# Patient Record
Sex: FEMALE | Race: WHITE | NOT HISPANIC OR LATINO | Employment: OTHER | URBAN - NONMETROPOLITAN AREA
[De-identification: names, ages, dates, MRNs, and addresses within clinical notes are randomized per-mention and may not be internally consistent; named-entity substitution may affect disease eponyms.]

---

## 2024-06-14 RX ORDER — ASPIRIN 81 MG/1
81 TABLET ORAL DAILY
COMMUNITY

## 2024-06-14 RX ORDER — METHADONE HYDROCHLORIDE 10 MG/1
TABLET ORAL
COMMUNITY

## 2024-11-05 ENCOUNTER — TELEPHONE (OUTPATIENT)
Dept: OBSTETRICS AND GYNECOLOGY | Facility: CLINIC | Age: 29
End: 2024-11-05
Payer: COMMERCIAL

## 2024-11-05 NOTE — TELEPHONE ENCOUNTER
She can't abruptly stop methadone. Does she get this from a clinic? If so, she can ask them about weaning down or stopping.

## 2024-11-05 NOTE — TELEPHONE ENCOUNTER
Patient called stating she takes Methadone, but she fount out she was pregnant. She has a new ob appointment 11/22. Her PCP told her she is not able to just stop the medication but patient was very persistent on stopping the medication.

## 2024-11-22 ENCOUNTER — INITIAL PRENATAL (OUTPATIENT)
Dept: OBSTETRICS AND GYNECOLOGY | Facility: CLINIC | Age: 29
End: 2024-11-22
Payer: COMMERCIAL

## 2024-11-22 VITALS
BODY MASS INDEX: 50.02 KG/M2 | SYSTOLIC BLOOD PRESSURE: 140 MMHG | WEIGHT: 293 LBS | HEIGHT: 64 IN | DIASTOLIC BLOOD PRESSURE: 84 MMHG

## 2024-11-22 DIAGNOSIS — O99.321 SUBSTANCE ABUSE AFFECTING PREGNANCY IN FIRST TRIMESTER, ANTEPARTUM: Primary | ICD-10-CM

## 2024-11-22 DIAGNOSIS — F19.10 SUBSTANCE ABUSE AFFECTING PREGNANCY IN FIRST TRIMESTER, ANTEPARTUM: Primary | ICD-10-CM

## 2024-11-22 DIAGNOSIS — Z12.4 SCREENING FOR CERVICAL CANCER: ICD-10-CM

## 2024-11-22 PROBLEM — F11.20 METHADONE MAINTENANCE TREATMENT AFFECTING PREGNANCY: Status: ACTIVE | Noted: 2024-11-22

## 2024-11-22 PROBLEM — O99.320 METHADONE MAINTENANCE TREATMENT AFFECTING PREGNANCY: Status: ACTIVE | Noted: 2024-11-22

## 2024-11-22 RX ORDER — FLUCONAZOLE 150 MG/1
TABLET ORAL
COMMUNITY
Start: 2024-11-19

## 2024-11-22 RX ORDER — NYSTATIN 100000 [USP'U]/ML
SUSPENSION ORAL
COMMUNITY
Start: 2024-11-13

## 2024-11-22 RX ORDER — PROMETHAZINE HYDROCHLORIDE 25 MG/1
25 TABLET ORAL EVERY 6 HOURS PRN
Qty: 20 TABLET | Refills: 0 | Status: SHIPPED | OUTPATIENT
Start: 2024-11-22

## 2024-11-22 RX ORDER — ONDANSETRON 4 MG/1
TABLET, ORALLY DISINTEGRATING ORAL
COMMUNITY
Start: 2024-11-17

## 2024-11-22 NOTE — PROGRESS NOTES
"Subjective     Chief Complaint   Patient presents with    Initial Prenatal Visit     Last pap done in , C/O nausea, thrush on tongue       Valentina Campos is a 29 y.o. .  No LMP recorded. Patient is pregnant..  She presents to be seen to initiate prenatal care with our practice. She is currently being treated for thrush with swish and swallow and Diflucan. She hasn't been using Nystatin because it burns and she has sores in her mouth. She got treatment from urgent care. She has had 3 successful pregnancies with  at term. Her last pregnancy in  was complicated by elevated BP but doesn't remember whether she was on meds or not. Poor historian. She is currently on Methadone which she gets from [facility] clinic. She wants to cut down or quit this. She has been taking Zofran for nausea/vomiting but it isn't helping.    Past Medical History:   Diagnosis Date    Substance abuse      Social History     Socioeconomic History    Marital status:    Tobacco Use    Smoking status: Every Day     Current packs/day: 1.00     Average packs/day: 1 pack/day for 10.9 years (10.9 ttl pk-yrs)     Types: Cigarettes     Start date:     Smokeless tobacco: Never   Vaping Use    Vaping status: Never Used   Substance and Sexual Activity    Alcohol use: Not Currently    Drug use: Not Currently     Types: Methamphetamines, Methaqualone     Comment: clean 4 years    Sexual activity: Defer         The following portions of the patient's history were reviewed and updated as appropriate:vital signs, allergies, current medications, past family history, past medical history, past social history, past surgical history, and problem list.    Review of Systems -   /84   Ht 162.6 cm (64\")   Wt (!) 144 kg (316 lb 14.4 oz)   BMI 54.40 kg/m²   Gastrointestinal: Nausea and vomiting, denies constipation   Genitourinary: Frequency - denies urgency or burning with urination  All other systems reviewed and are negative    Objective "     Physical Exam  Constitutional   The patient is alert, well developed & well nourished.   Neck   The neck is supple and the trachea is midline. The thyroid is not enlarged and there are no palpable nodules.   Respiratory  The patient is relaxed and breathes without effort. Lungs CTAB  Cardiovascular  Regular rate and rhythm without murmur -  Negative LE pitting edema  Gastrointestinal   The abdomen is soft and non tender. No hepatosplenomegaly  Genitourinary   - External Genitalia without erythema, lesions, or masses  -Vagina - There is white vaginal discharge.   -Cervix without discharge  Negative cervical motion tenderness   Uterus - uterine body size is approximate to dates  Adnexa structures are without masses  Perineum is without inflammation or lesion  Skin  Normal color. No rashes or lesions  Extremities  Full ROM. No rashes or edema  Psychiatric  The patient is oriented to person, place, and time. Speech is fluent and words are clear    Imaging   Pelvic ultrasound report  10w, + FHT  US Ob Transvaginal (11/22/2024 10:00)       Assessment & Plan     ASSESSMENT  IUP at 10w0d   Hx of GHTN  First trimester discomforts of pregnancy.     PLAN  Tests ordered today:  Orders Placed This Encounter   Procedures    OB Panel With HIV and RPR     Order Specific Question:   Release to patient     Answer:   Routine Release [[id number]]    Urine Drug Screen - Urine, Clean Catch     Order Specific Question:   Release to patient     Answer:   Routine Release [[id number]]    [id number] PLUS Core+SCA - Blood,     Order Specific Question:   [facility] Date of last menstrual period or estimated date of delivery (corresponding to calculation method):     Answer:   6/20/2025     Order Specific Question:   [facility] Gestational age calculation method:     Answer:   TRISHA,EDC     Order Specific Question:   Release to patient     Answer:   Routine Release [[id number]]    Comprehensive Metabolic Panel     Order Specific Question:   Release  to patient     Answer:   Routine Release [[id number]]     Medications prescribed today:  New Medications Ordered This Visit   Medications    promethazine (PHENERGAN) 25 MG tablet     Sig: Take 1 tablet by mouth Every 6 (Six) Hours As Needed for Nausea or Vomiting.     Dispense:  20 tablet     Refill:  0     Information reviewed: smoking in pregnancy, exercise in pregnancy, nutrition in pregnancy, weight gain in pregnancy, work and travel restrictions during pregnancy, list of OTC medications acceptable in pregnancy, and call coverage groups  Genetic testing reviewed: Cystic Fibrosis Screen  Ginger products, Vit B6 supp, Unisom, or seabands PRN  Advised to F/U with PCP regarding thrush/oral health      Follow up: 4 week(s)         This note was electronically signed.    Jenna Tom CNM  11/22/2024

## 2024-11-24 LAB
ALBUMIN SERPL-MCNC: 4 G/DL (ref 3.9–4.9)
ALP SERPL-CCNC: 84 IU/L (ref 44–121)
ALT SERPL-CCNC: 218 IU/L (ref 0–32)
AST SERPL-CCNC: 239 IU/L (ref 0–40)
BILIRUB SERPL-MCNC: 0.5 MG/DL (ref 0–1.2)
BUN SERPL-MCNC: 17 MG/DL (ref 6–20)
BUN/CREAT SERPL: 18 (ref 9–23)
CALCIUM SERPL-MCNC: 9.4 MG/DL (ref 8.7–10.2)
CHLORIDE SERPL-SCNC: 101 MMOL/L (ref 96–106)
CO2 SERPL-SCNC: 17 MMOL/L (ref 20–29)
CREAT SERPL-MCNC: 0.95 MG/DL (ref 0.57–1)
EGFRCR SERPLBLD CKD-EPI 2021: 82 ML/MIN/1.73
GLOBULIN SER CALC-MCNC: 4.4 G/DL (ref 1.5–4.5)
GLUCOSE SERPL-MCNC: 112 MG/DL (ref 70–99)
Lab: NORMAL
POTASSIUM SERPL-SCNC: 4 MMOL/L (ref 3.5–5.2)
PROT SERPL-MCNC: 8.4 G/DL (ref 6–8.5)
SODIUM SERPL-SCNC: 137 MMOL/L (ref 134–144)
WRITTEN AUTHORIZATION: NORMAL

## 2024-11-26 LAB — REF LAB TEST METHOD: NORMAL

## 2024-11-29 LAB
ABO GROUP BLD: NORMAL
AMPHETAMINES UR QL SCN: NEGATIVE NG/ML
BARBITURATES UR QL SCN: NEGATIVE NG/ML
BASOPHILS # BLD AUTO: 0 X10E3/UL (ref 0–0.2)
BASOPHILS NFR BLD AUTO: 1 %
BENZODIAZ UR QL SCN: NEGATIVE NG/ML
BLD GP AB SCN SERPL QL: NEGATIVE
BZE UR QL SCN: NEGATIVE NG/ML
CANNABINOIDS UR QL SCN: POSITIVE NG/ML
CFDNA.FET/CFDNA.TOTAL SFR FETUS: ABNORMAL %
CITATION REF LAB TEST: ABNORMAL
CREAT UR-MCNC: 305.7 MG/DL (ref 20–300)
EOSINOPHIL # BLD AUTO: 0.1 X10E3/UL (ref 0–0.4)
EOSINOPHIL NFR BLD AUTO: 1 %
ERYTHROCYTE [DISTWIDTH] IN BLOOD BY AUTOMATED COUNT: 12.7 % (ref 11.7–15.4)
FET 13+18+21+X+Y ANEUP PLAS.CFDNA: ABNORMAL
GA EST FROM CONCEPTION DATE: ABNORMAL D
GESTATIONAL AGE > 9:: YES
HBV SURFACE AG SERPL QL IA: NEGATIVE
HCT VFR BLD AUTO: 46.4 % (ref 34–46.6)
HCV AB SERPL QL IA: NORMAL
HCV IGG SERPL QL IA: NON REACTIVE
HGB BLD-MCNC: 15.2 G/DL (ref 11.1–15.9)
HIV 1+2 AB+HIV1 P24 AG SERPL QL IA: NON REACTIVE
IMM GRANULOCYTES # BLD AUTO: 0 X10E3/UL (ref 0–0.1)
IMM GRANULOCYTES NFR BLD AUTO: 0 %
LAB DIRECTOR NAME PROVIDER: ABNORMAL
LAB DIRECTOR NAME PROVIDER: ABNORMAL
LABORATORY COMMENT REPORT: ABNORMAL
LABORATORY COMMENT REPORT: ABNORMAL
LIMITATIONS OF THE TEST: ABNORMAL
LYMPHOCYTES # BLD AUTO: 2.5 X10E3/UL (ref 0.7–3.1)
LYMPHOCYTES NFR BLD AUTO: 32 %
MCH RBC QN AUTO: 30.8 PG (ref 26.6–33)
MCHC RBC AUTO-ENTMCNC: 32.8 G/DL (ref 31.5–35.7)
MCV RBC AUTO: 94 FL (ref 79–97)
METHADONE UR QL SCN: POSITIVE NG/ML
MONOCYTES # BLD AUTO: 0.4 X10E3/UL (ref 0.1–0.9)
MONOCYTES NFR BLD AUTO: 5 %
MORPHOLOGY BLD-IMP: NORMAL
NEGATIVE PREDICTIVE VALUE: ABNORMAL
NEUTROPHILS # BLD AUTO: 4.7 X10E3/UL (ref 1.4–7)
NEUTROPHILS NFR BLD AUTO: 61 %
NOTE: ABNORMAL
OPIATES UR QL SCN: NEGATIVE NG/ML
OXYCODONE+OXYMORPHONE UR QL SCN: NEGATIVE NG/ML
PCP UR QL: NEGATIVE NG/ML
PERFORMANCE CHARACTERISTICS: ABNORMAL
PH UR: 6.3 [PH] (ref 4.5–8.9)
PLATELET # BLD AUTO: 172 X10E3/UL (ref 150–450)
PROPOXYPH UR QL SCN: NEGATIVE NG/ML
RBC # BLD AUTO: 4.94 X10E6/UL (ref 3.77–5.28)
REF LAB TEST METHOD: ABNORMAL
RH BLD: POSITIVE
RPR SER QL: NON REACTIVE
RUBV IGG SERPL IA-ACNC: 2.3 INDEX
TEST PERFORMANCE INFO SPEC: ABNORMAL
WBC # BLD AUTO: 7.7 X10E3/UL (ref 3.4–10.8)

## 2024-12-03 DIAGNOSIS — O99.321 SUBSTANCE ABUSE AFFECTING PREGNANCY IN FIRST TRIMESTER, ANTEPARTUM: Primary | ICD-10-CM

## 2024-12-03 DIAGNOSIS — F19.10 SUBSTANCE ABUSE AFFECTING PREGNANCY IN FIRST TRIMESTER, ANTEPARTUM: Primary | ICD-10-CM

## 2024-12-07 LAB
CFDNA.FET/CFDNA.TOTAL SFR FETUS: NORMAL %
CITATION REF LAB TEST: NORMAL
FET 13+18+21+X+Y ANEUP PLAS.CFDNA: NEGATIVE
FET CHR 21 TS PLAS.CFDNA QL: NEGATIVE
FET MS X RISK WBC.DNA+CFDNA QL: NOT DETECTED
FET SEX PLAS.CFDNA DOSAGE CFDNA: NORMAL
FET TS 13 RISK PLAS.CFDNA QL: NEGATIVE
FET TS 18 RISK WBC.DNA+CFDNA QL: NEGATIVE
FET X + Y ANEUP RISK PLAS.CFDNA SEQ-IMP: NOT DETECTED
GA EST FROM CONCEPTION DATE: NORMAL D
GESTATIONAL AGE > 9:: YES
LAB DIRECTOR NAME PROVIDER: NORMAL
LAB DIRECTOR NAME PROVIDER: NORMAL
LABORATORY COMMENT REPORT: NORMAL
LIMITATIONS OF THE TEST: NORMAL
NEGATIVE PREDICTIVE VALUE: NORMAL
NOTE: NORMAL
PERFORMANCE CHARACTERISTICS: NORMAL
POSITIVE PREDICTIVE VALUE: NORMAL
REF LAB TEST METHOD: NORMAL
TEST PERFORMANCE INFO SPEC: NORMAL

## 2024-12-12 ENCOUNTER — APPOINTMENT (OUTPATIENT)
Dept: ULTRASOUND IMAGING | Facility: HOSPITAL | Age: 29
End: 2024-12-12
Payer: COMMERCIAL

## 2024-12-12 ENCOUNTER — HOSPITAL ENCOUNTER (EMERGENCY)
Facility: HOSPITAL | Age: 29
Discharge: HOME | End: 2024-12-12
Attending: EMERGENCY MEDICINE
Payer: COMMERCIAL

## 2024-12-12 VITALS
RESPIRATION RATE: 16 BRPM | HEIGHT: 64 IN | SYSTOLIC BLOOD PRESSURE: 133 MMHG | HEART RATE: 82 BPM | WEIGHT: 293 LBS | DIASTOLIC BLOOD PRESSURE: 87 MMHG | TEMPERATURE: 97.7 F | OXYGEN SATURATION: 96 % | BODY MASS INDEX: 50.02 KG/M2

## 2024-12-12 DIAGNOSIS — O46.90 VAGINAL BLEEDING IN PREGNANCY: Primary | ICD-10-CM

## 2024-12-12 LAB
ABO GROUP BLD: NORMAL
ALBUMIN SERPL-MCNC: 3.5 G/DL (ref 3.5–5.2)
ALBUMIN/GLOB SERPL: 1 G/DL
ALP SERPL-CCNC: 57 U/L (ref 39–117)
ALT SERPL W P-5'-P-CCNC: 10 U/L (ref 1–33)
ANION GAP SERPL CALCULATED.3IONS-SCNC: 11.7 MMOL/L (ref 5–15)
AST SERPL-CCNC: 14 U/L (ref 1–32)
BASOPHILS # BLD AUTO: 0.02 10*3/MM3 (ref 0–0.2)
BASOPHILS NFR BLD AUTO: 0.2 % (ref 0–1.5)
BILIRUB SERPL-MCNC: 0.3 MG/DL (ref 0–1.2)
BUN SERPL-MCNC: 11 MG/DL (ref 6–20)
BUN/CREAT SERPL: 16.7 (ref 7–25)
CALCIUM SPEC-SCNC: 9 MG/DL (ref 8.6–10.5)
CHLORIDE SERPL-SCNC: 104 MMOL/L (ref 98–107)
CO2 SERPL-SCNC: 21.3 MMOL/L (ref 22–29)
CREAT SERPL-MCNC: 0.66 MG/DL (ref 0.57–1)
DEPRECATED RDW RBC AUTO: 44.8 FL (ref 37–54)
EGFRCR SERPLBLD CKD-EPI 2021: 120.4 ML/MIN/1.73
EOSINOPHIL # BLD AUTO: 0.18 10*3/MM3 (ref 0–0.4)
EOSINOPHIL NFR BLD AUTO: 2.1 % (ref 0.3–6.2)
ERYTHROCYTE [DISTWIDTH] IN BLOOD BY AUTOMATED COUNT: 13.2 % (ref 12.3–15.4)
GLOBULIN UR ELPH-MCNC: 3.6 GM/DL
GLUCOSE SERPL-MCNC: 107 MG/DL (ref 65–99)
HCG INTACT+B SERPL-ACNC: NORMAL MIU/ML
HCT VFR BLD AUTO: 36.3 % (ref 34–46.6)
HGB BLD-MCNC: 12.4 G/DL (ref 12–15.9)
IMM GRANULOCYTES # BLD AUTO: 0.04 10*3/MM3 (ref 0–0.05)
IMM GRANULOCYTES NFR BLD AUTO: 0.5 % (ref 0–0.5)
LYMPHOCYTES # BLD AUTO: 1.79 10*3/MM3 (ref 0.7–3.1)
LYMPHOCYTES NFR BLD AUTO: 20.9 % (ref 19.6–45.3)
MCH RBC QN AUTO: 31.6 PG (ref 26.6–33)
MCHC RBC AUTO-ENTMCNC: 34.2 G/DL (ref 31.5–35.7)
MCV RBC AUTO: 92.6 FL (ref 79–97)
MONOCYTES # BLD AUTO: 0.41 10*3/MM3 (ref 0.1–0.9)
MONOCYTES NFR BLD AUTO: 4.8 % (ref 5–12)
NEUTROPHILS NFR BLD AUTO: 6.14 10*3/MM3 (ref 1.7–7)
NEUTROPHILS NFR BLD AUTO: 71.5 % (ref 42.7–76)
NRBC BLD AUTO-RTO: 0 /100 WBC (ref 0–0.2)
PLATELET # BLD AUTO: 188 10*3/MM3 (ref 140–450)
PMV BLD AUTO: 11.1 FL (ref 6–12)
POTASSIUM SERPL-SCNC: 4 MMOL/L (ref 3.5–5.2)
PROT SERPL-MCNC: 7.1 G/DL (ref 6–8.5)
RBC # BLD AUTO: 3.92 10*6/MM3 (ref 3.77–5.28)
RH BLD: POSITIVE
SODIUM SERPL-SCNC: 137 MMOL/L (ref 136–145)
WBC NRBC COR # BLD AUTO: 8.58 10*3/MM3 (ref 3.4–10.8)

## 2024-12-12 PROCEDURE — 76801 OB US < 14 WKS SINGLE FETUS: CPT

## 2024-12-12 PROCEDURE — 85025 COMPLETE CBC W/AUTO DIFF WBC: CPT | Performed by: EMERGENCY MEDICINE

## 2024-12-12 PROCEDURE — 99284 EMERGENCY DEPT VISIT MOD MDM: CPT | Performed by: EMERGENCY MEDICINE

## 2024-12-12 PROCEDURE — 84702 CHORIONIC GONADOTROPIN TEST: CPT | Performed by: EMERGENCY MEDICINE

## 2024-12-12 PROCEDURE — 80053 COMPREHEN METABOLIC PANEL: CPT | Performed by: EMERGENCY MEDICINE

## 2024-12-12 PROCEDURE — 86901 BLOOD TYPING SEROLOGIC RH(D): CPT | Performed by: EMERGENCY MEDICINE

## 2024-12-12 PROCEDURE — 86900 BLOOD TYPING SEROLOGIC ABO: CPT | Performed by: EMERGENCY MEDICINE

## 2024-12-12 PROCEDURE — 36415 COLL VENOUS BLD VENIPUNCTURE: CPT

## 2024-12-12 NOTE — ED PROVIDER NOTES
[facility] EMERGENCY DEPARTMENT  Emergency Department Encounter  Emergency Medicine Physician Note     Pt Name:Valentina Campos  MRN: [id number]  Birthdate 1995  Date of evaluation: 2024  PCP:  Eric Elliott MD  Note Started: 12:39 PM EST      CHIEF COMPLAINT       Chief Complaint   Patient presents with    Vaginal Bleeding - Pregnant       HISTORY OF PRESENT ILLNESS  (Location/Symptom, Timing/Onset, Context/Setting, Quality, Duration, Modifying Factors, Severity.)      Valentina Campos is a 29 y.o. female who presents with vaginal bleeding, patient describes more vaginal spotting at this time.  Describes earlier this morning that she had blood running down her leg into a small little pool that she demonstrates is a couple inch diameter.  Patient is noted to be , with 1 child fatality and 2 miscarriages.  Patient denies any abdominal pain, does describe back pain.  Patient describes onset of symptoms around 5 AM this morning.  Patient was seen at outside ER earlier today and evaluated.  Patient was instructed to be evaluated in our emergency department by OB/GYN which she called nursing line.      PAST MEDICAL / SURGICAL / SOCIAL / FAMILY HISTORY     Past Medical History:   Diagnosis Date    Substance abuse      No additional pertinent       Past Surgical History:   Procedure Laterality Date    CHOLECYSTECTOMY      MOUTH SURGERY       No additional pertinent       Social History     Socioeconomic History    Marital status:    Tobacco Use    Smoking status: Every Day     Current packs/day: 1.00     Average packs/day: 1 pack/day for 10.9 years (10.9 ttl pk-yrs)     Types: Cigarettes     Start date:     Smokeless tobacco: Never   Vaping Use    Vaping status: Never Used   Substance and Sexual Activity    Alcohol use: Not Currently    Drug use: Not Currently     Types: Methamphetamines, Methaqualone     Comment: clean 4 years    Sexual activity: Defer       Family  "History   Problem Relation Age of Onset    Obesity Mother     Cancer Mother     Hypertension Father     Obesity Maternal Grandmother        Allergies:  Patient has no known allergies.    Home Medications:  Prior to Admission medications    Medication Sig Start Date End Date Taking? Authorizing Provider   fluconazole (DIFLUCAN) 150 MG tablet TAKE 1 TABLET BY MOUTH EVERY 72 HOURS FOR 4 DAYS 11/19/24   Jesus Clark MD   methadone (DOLOPHINE) 10 MG tablet Take by mouth,    Jesus Clark MD   nystatin (MYCOSTATIN) 100,000 unit/mL suspension SHAKE LIQUID AND SWISH AND SWALLOW 4 ML BY MOUTH FOUR TIMES DAILY FOR 14 DAYS 11/13/24   Jesus Clark MD   ondansetron ODT (ZOFRAN-ODT) 4 MG disintegrating tablet DISSOLVE 1 TO 2 TABLETS ON THE TONGUE EVERY 8 HOURS AS NEEDED 11/17/24   Jesus Clark MD   promethazine (PHENERGAN) 25 MG tablet Take 1 tablet by mouth Every 6 (Six) Hours As Needed for Nausea or Vomiting. 11/22/24   Jenna Tom CNM         REVIEW OF SYSTEMS       Review of Systems   Respiratory:  Negative for chest tightness and shortness of breath.    Cardiovascular:  Negative for chest pain.   Gastrointestinal:  Negative for abdominal pain, nausea and vomiting.   Endocrine: Negative for polyuria.   Genitourinary:  Positive for vaginal bleeding. Negative for difficulty urinating and frequency.   Musculoskeletal:  Positive for back pain.       PHYSICAL EXAM      INITIAL VITALS:   /87   Pulse 82   Temp 97.7 °F (36.5 °C) (Oral)   Resp 16   Ht 162.6 cm (64\")   Wt (!) 149 kg (328 lb 7.8 oz)   SpO2 96%   BMI 56.38 kg/m²     Physical Exam  Constitutional:       Appearance: Normal appearance. She is obese.   HENT:      Head: Normocephalic and atraumatic.      Mouth/Throat:      Mouth: Mucous membranes are moist.      Pharynx: Oropharynx is clear.   Cardiovascular:      Rate and Rhythm: Normal rate and regular rhythm.      Pulses: Normal pulses.   Pulmonary:      Effort: " Pulmonary effort is normal.   Abdominal:      General: Abdomen is flat. There is no distension.      Palpations: Abdomen is soft.      Tenderness: There is no abdominal tenderness. There is right CVA tenderness. There is no guarding.   Musculoskeletal:         General: Normal range of motion.   Skin:     General: Skin is warm and dry.   Neurological:      General: No focal deficit present.      Mental Status: She is alert and oriented to person, place, and time.   Psychiatric:         Mood and Affect: Mood normal.         Behavior: Behavior normal.           DDX/DIAGNOSTIC RESULTS / EMERGENCY DEPARTMENT COURSE / MDM     Differential Diagnosis included but not limited: Abnormal bleeding in pregnancy.  Subchorionic hemorrhage, placenta previa, miscarriage    Diagnoses Considered but Do Not Suspect: Ectopic pregnancy, active miscarriage    Decision Rules/Scores utilized: N/A     Tests considered but not ordered and why:  N/A     MIPS: N/A     Code Status Discussion:  Not Discussed    Additional Patient Education Provided: None     Medical Decision Making    Medical Decision Making  Patient presenting of vaginal bleeding and prep full week pregnant.  Patient is a high risk pregnancy due to drug use and smoking.  Patient was evaluated in outside emergency department earlier today for vaginal bleeding.  Patient was stable vitals, nontachycardic nonhypotensive.  Patient with back pain.  Ultrasound demonstrated fetal movement normal on bedside, unable to assess the heart rate on bedside ultrasound.  Formal ultrasound demonstrated heart rate noted to be normal.  Patient with hCGs its appropriate, normal hemoglobin, low concerns for dysuria or vaginal bleed at this time.  Patient instructed to follow-up with her OB/GYN, no interventions at this time as patient is under 20 weeks.  Patient instructed to continue her prenatal vitamins.  Patient instructed to quit smoking.  Patient was agreeable with this, multiple minutes spent  on smoking sensation discussion with her.  Patient states that she will follow-up with her OB/GYN next week, does have an appointment 12/23 and do feel this is appropriate if they are unable to get her in sooner.  Patient declined vaginal exam, wanted to be discharged.  Patient instructed return for any worsening abdominal pain, vaginal bleeding, or any other concerns.  Patient did inform that she could be having a miscarriage if things continue to worsen.    Problems Addressed:  Vaginal bleeding in pregnancy: complicated acute illness or injury    Amount and/or Complexity of Data Reviewed  Labs: ordered.  Radiology: ordered.        See ED COURSE for additional MDM statements    EKG  None Performed     All EKG's are interpreted by the Emergency Department Physician who either signs or Co-signs this chart in the absence of a cardiologist.    Additional Scores                   EMERGENCY DEPARTMENT COURSE:    ED Course as of 12/12/24 1519   Thu Dec 12, 2024   1138 Patient with previous Rh+ no indication for RhoGAM. [CR]   1337 Patient with appropriate quant. [CR]   1352 Patient educated on the importance to quit smoking with being pregnant. [CR]   1356 Patient requesting discharge, requesting IV out.  Patient was offered vaginal exam, patient declining vaginal exam.  Patient wishes to get in with her OB/GYN, she has an appointment on 23 December.  Did instruct her to try to see if she can see them earlier next week if possible.  Patient is agreeable with this. [CR]      ED Course User Index  [CR] Wang Cifuentes,        PROCEDURES:  None Performed   Procedures    DATA FOR LAB AND RADIOLOGY TESTS ORDERED BELOW ARE REVIEWED BY THE ED CLINICIAN:    RADIOLOGY: All x-rays, CT, MRI, and formal ultrasound images (except ED bedside ultrasound) are read by the radiologist, see reports below, unless otherwise noted in MDM or here.  Reports below are reviewed by myself.  US Ob < 14 Weeks Single or First Gestation    Final Result   Single intrauterine pregnancy 13 weeks 3 days gestational   age with documented fetal heart motion.               This report was signed and finalized on 12/12/2024 1:39 PM by Erika Henriquez MD.              LABS: Lab orders shown below, the results are reviewed by myself, and all abnormals are listed below.  Labs Reviewed   COMPREHENSIVE METABOLIC PANEL - Abnormal; Notable for the following components:       Result Value    Glucose 107 (*)     CO2 21.3 (*)     All other components within normal limits    Narrative:     GFR Categories in Chronic Kidney Disease (CKD)      GFR Category          GFR (mL/min/1.73)    Interpretation  G1                     90 or greater         Normal or high (1)  G2                      60-89                Mild decrease (1)  G3a                   45-59                Mild to moderate decrease  G3b                   30-44                Moderate to severe decrease  G4                    15-29                Severe decrease  G5                    14 or less           Kidney failure          (1)In the absence of evidence of kidney disease, neither GFR category G1 or G2 fulfill the criteria for CKD.    eGFR calculation 2021 CKD-EPI creatinine equation, which does not include race as a factor   CBC WITH AUTO DIFFERENTIAL - Abnormal; Notable for the following components:    Monocyte % 4.8 (*)     All other components within normal limits   HCG, QUANTITATIVE, PREGNANCY    Narrative:     HCG Ranges by Gestational Age    Females - non-pregnant premenopausal   </= 1mIU/mL HCG  Females - postmenopausal               </= 7mIU/mL HCG    3 Weeks         5.8 -    71.2 mIU/mL  4 Weeks         9.5 -     750 mIU/mL  5 Weeks         217 -   7,138 mIU/mL  6 Weeks         158 -  31,795 mIU/mL  7 Weeks       3,697 - 163,563 mIU/mL  8 Weeks      32,065 - 149,571 mIU/mL  9 Weeks      63,803 - 151,410 mIU/mL  10 Weeks     46,509 - 186,977 mIU/mL  12 Weeks     27,832 - 210,612 mIU/mL  14 Weeks   "   13,950 -  62,530 mIU/mL  15 Weeks     12,039 -  70,971 mIU/mL  16 Weeks      9,040 -  56,451 mIU/mL  17 Weeks      8,175 -  55,868 mIU/mL  18 Weeks      8,099 -  58,176 mIU/mL   ABO/RH   CBC AND DIFFERENTIAL    Narrative:     The following orders were created for panel order CBC & Differential.  Procedure                               Abnormality         Status                     ---------                               -----------         ------                     CBC Auto Differential[[id number]]        Abnormal            Final result                 Please view results for these tests on the individual orders.       Vitals Reviewed:    Vitals:    12/12/24 1129 12/12/24 1143 12/12/24 1420   BP: 141/69  133/87   Pulse: 87  82   Resp: 18  16   Temp: 97.7 °F (36.5 °C)     TempSrc: Oral     SpO2: 99%  96%   Weight:  (!) 149 kg (328 lb 7.8 oz)    Height: 162.6 cm (64\")         MEDICATIONS GIVEN TO PATIENT THIS ENCOUNTER:  Medications - No data to display    CONSULTS:  None    CRITICAL CARE:  There was significant risk of life threatening deterioration of patient's condition requiring my direct management. Critical care time 0 minutes, excluding any documented procedures.    FINAL IMPRESSION      1. Vaginal bleeding in pregnancy          DISPOSITION / PLAN     ED Disposition       ED Disposition   Discharge    Condition   Stable    Comment   --               PATIENT REFERRED TO:  [facility]  [address], [address]  [place] [address] [phone]  Schedule an appointment as soon as possible for a visit in 2 days        DISCHARGE MEDICATIONS:     Medication List        CONTINUE taking these medications      fluconazole 150 MG tablet  Commonly known as: DIFLUCAN     methadone 10 MG tablet  Commonly known as: DOLOPHINE     nystatin 100,000 unit/mL suspension  Commonly known as: MYCOSTATIN     ondansetron ODT 4 MG disintegrating tablet  Commonly known as: " ZOFRAN-ODT     promethazine 25 MG tablet  Commonly known as: PHENERGAN  Take 1 tablet by mouth Every 6 (Six) Hours As Needed for Nausea or Vomiting.              Electronically signed by Wang Cifuentes DO, 12/12/24, 12:39 PM EST.    Emergency Medicine Physician  [facility]  (Please note that portions of thisnote were completed with a voice recognition program.  Efforts were made to edit the dictations but occasionally words are mis-transcribed.)         Wang Cifuentes,   12/12/24 1529

## 2024-12-30 ENCOUNTER — PATIENT OUTREACH (OUTPATIENT)
Dept: LABOR AND DELIVERY | Facility: HOSPITAL | Age: 29
End: 2024-12-30
Payer: COMMERCIAL

## 2024-12-30 ENCOUNTER — ROUTINE PRENATAL (OUTPATIENT)
Dept: OBSTETRICS AND GYNECOLOGY | Facility: CLINIC | Age: 29
End: 2024-12-30
Payer: COMMERCIAL

## 2024-12-30 ENCOUNTER — REFERRAL TRIAGE (OUTPATIENT)
Dept: LABOR AND DELIVERY | Facility: HOSPITAL | Age: 29
End: 2024-12-30
Payer: COMMERCIAL

## 2024-12-30 VITALS — BODY MASS INDEX: 54.58 KG/M2 | SYSTOLIC BLOOD PRESSURE: 130 MMHG | DIASTOLIC BLOOD PRESSURE: 80 MMHG | WEIGHT: 293 LBS

## 2024-12-30 DIAGNOSIS — F11.20 METHADONE MAINTENANCE TREATMENT AFFECTING PREGNANCY IN SECOND TRIMESTER: Primary | ICD-10-CM

## 2024-12-30 DIAGNOSIS — O99.322 METHADONE MAINTENANCE TREATMENT AFFECTING PREGNANCY IN SECOND TRIMESTER: Primary | ICD-10-CM

## 2024-12-30 RX ORDER — FAMOTIDINE 20 MG/1
20 TABLET, FILM COATED ORAL 2 TIMES DAILY PRN
Qty: 60 TABLET | Refills: 3 | Status: SHIPPED | OUTPATIENT
Start: 2024-12-30 | End: 2025-12-30

## 2024-12-30 NOTE — PROGRESS NOTES
Chief Complaint   Patient presents with    Routine Prenatal Visit     No complaints/concerns        HPI: Valentina is a  currently at 15w3d here for prenatal visit who reports the following:  She denies any problems. Her methadone was decreased to 72 mg. Zofran didn't help with nausea and Phenergan seemed to worsen it.                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  GABI Bagley  2024

## 2024-12-31 NOTE — OUTREACH NOTE
[facility]  Intake    Current Estimated Gestational Age: 15w3d    Intake Assessment      Flowsheet Row Responses   Best Method for Contacting Cell   Currently Employed No   Able to keep appointments as scheduled Yes   Gender(s) and Name(s) boy-Shashi   Do you have a dentist? No   Resources Presently Utilizing: WIC (Women, Infant, Children), Other   Other Resources Utilizing: SNAP   Maternal Warning Signs Provided   Other Education HANDS, Housing Assistance, How to find a dentist, How to find a pediatrician, How to find a primary care provider, Insurance benefits/Incentives, SNAP Benefits, WIC Benefits            Learning Assessment      Flowsheet Row Responses   Relationship Patient   Learner Name Valentina Campos   Does the learner have any barriers to learning? No Barriers   What is the preferred language of the learner for medical teaching? English   Is an  required? No   How does the learner prefer to learn new concepts? Listening, Reading, Demonstration, Pictures/Video            Met with pt in office. Valentina is seeing GABI Swann for her prenatal care today.  Her history is significant for hx substance use clean x4 years, current methadone use.    She has no SI/HI and currently feels safe. Encouraged to consider seeing a mental health provider. Discussed that we will be screening periodically for  and postpartum changes with mood looking for trends which may need to be addressed. VU.    She is feeling well and has begun gathering items she will need for baby care. She is not interested in childbirth classes. Prenatal and breastfeeding education discussed. Discussed bonus benefits of pregnancy from insurance for potential assistance with some infant care items. She plans to use [facility] Kuhn for pediatrician in [facility]. She has a 3 & 9 y/o girls and is currently pregnant with a baby boy she plans to name Shashi.      RN gave pt food bags with resources to get her by until  her food stamps renew on 12th. Multiple resources provided via [employer] message. Encouraged to look at both the [employer] message and in the Visits tab for educational items pertaining to her pregnancy in the AVS. Contact information provided. Encouraged to call with questions, concerns, or for support. Will plan f/u around 28 weeks for wellness and resource needs check in.    Tobacco, Alcohol, and Drug History     reports that she has been smoking cigarettes. She started smoking about 11 years ago. She has a 11 pack-year smoking history. She has never used smokeless tobacco.   reports that she does not currently use alcohol.   reports that she does not currently use drugs after having used the following drugs: Methamphetamines and Methaqualone.    Alicia GUERRIER  Maternity Nurse Navigator    12/30/2024, 1302EST            [facility]  Enrollment    Current Estimated Gestational Age: 15w3d    Questions/Answers      Flowsheet Row Responses   Would like to participate? Yes   Date of Intake Visit 12/30/24                Alicia GUERRIER  Maternity Nurse Navigator    12/30/2024, 1302EST

## 2025-03-24 ENCOUNTER — PATIENT OUTREACH (OUTPATIENT)
Dept: LABOR AND DELIVERY | Facility: HOSPITAL | Age: 30
End: 2025-03-24
Payer: COMMERCIAL

## 2025-03-24 ENCOUNTER — ROUTINE PRENATAL (OUTPATIENT)
Dept: OBSTETRICS AND GYNECOLOGY | Facility: CLINIC | Age: 30
End: 2025-03-24
Payer: COMMERCIAL

## 2025-03-24 VITALS — DIASTOLIC BLOOD PRESSURE: 80 MMHG | WEIGHT: 293 LBS | BODY MASS INDEX: 52.35 KG/M2 | SYSTOLIC BLOOD PRESSURE: 126 MMHG

## 2025-03-24 DIAGNOSIS — F11.20 METHADONE MAINTENANCE TREATMENT AFFECTING PREGNANCY IN SECOND TRIMESTER: ICD-10-CM

## 2025-03-24 DIAGNOSIS — Z36.89 ENCOUNTER FOR FETAL ANATOMIC SURVEY: Primary | ICD-10-CM

## 2025-03-24 DIAGNOSIS — O99.322 METHADONE MAINTENANCE TREATMENT AFFECTING PREGNANCY IN SECOND TRIMESTER: ICD-10-CM

## 2025-03-24 PROCEDURE — 99213 OFFICE O/P EST LOW 20 MIN: CPT | Performed by: MIDWIFE

## 2025-03-24 NOTE — PROGRESS NOTES
Chief Complaint   Patient presents with    Routine Prenatal Visit     Has not been seen since 15 weeks        HPI: Valentina is a  currently at 27w3d here for prenatal visit who reports the following:  Baby is active. She has had transportation issues. She hasn't done anatomy scan or glucola. Her methadone dose is down to 65 mg daily.                EXAM:     Vitals:    25 1351   BP: 126/80      Abdomen:   See prenatal flowsheet as noted and reviewed, soft, nontender   Pelvic:  See prenatal flowsheet as noted and reviewed   Urine:  See prenatal flowsheet as noted and reviewed    Lab Results   Component Value Date    ABO A 2024    RH Positive 2024    ABSCRN Negative 2024       MDM:  Impression: H/O prior narcotics abuse - in remission on maintenance  Obesity in pregnancy   Tests done today: Anatomy scan-anatomy not completely seen   Topics discussed: kick counts and fetal movement  Referral to [facility] for anatomy scan  Reviewed OB labs   Tests next visit: GCT  HgB                RTO:                        2 weeks    This note was electronically signed.  Jenna Tom, GABI  3/24/2025   Chief complaint:   Chief Complaint   Patient presents with    Back Pain     11/22/23 Right L3-L4 transforaminal epidural steroid injection       Vitals:  Visit Vitals  /80   Pulse 76   Resp 14   Ht 5' 11\" (1.803 m)   Wt 84.4 kg (186 lb)   BMI 25.94 kg/m²       HISTORY OF PRESENT ILLNESS     HPI  Mr. Montgomery is a 65 year old male  with a history of  low back pain who presents today for a follow-up visit after right L3-4 transforaminal epidural steroid injection done 11/22/2023.  He states that he is currently pain-free.  He is occasional twinges of pain.  However he is able to carry out most of his activities without significant pain.  Also has left shoulder and left knee pain for which she is had steroid injections by another provider and he is have helped.  Other significant problems:  Patient Active Problem List    Diagnosis Date Noted    History of alcohol dependence (CMD) 10/27/2023     Priority: Low    Hammer toe of left foot 10/27/2023     Priority: Low    THIERNO (generalized anxiety disorder) 06/16/2023     Priority: Low    Hypovitaminosis D 03/31/2022     Priority: Low    Dyslipidemia 10/01/2021     Priority: Low    Lichen simplex of male genitalia 10/01/2021     Priority: Low    Nocturnal leg cramps 02/13/2020     Priority: Low    Agatston coronary artery calcium score greater than 400      Priority: Low    Male hypogonadism 02/27/2015     Priority: Low    Seasonal allergies 11/29/2012     Priority: Low    Essential hypertension, benign 11/29/2012     Priority: Low    Chronic insomnia 11/29/2012     Priority: Low    Migraine 11/29/2012     Priority: Low       PAST MEDICAL, FAMILY AND SOCIAL HISTORY     Medications:  Current Outpatient Medications   Medication Sig Dispense Refill    diazePAM (VALIUM) 5 MG tablet Take 1 tablet by mouth daily as needed for Anxiety. Month supply. 20 tablet 1    zoster vaccine recomb adjuvanted (SHINGRIX) 50 MCG/0.5ML injection Inject 0.5 mLs into the muscle 1 time. 1 each 0     fluconazole (DIFLUCAN) 150 MG tablet Take 2 tablets by mouth 1 day a week. 8 tablet 0    clobetasol (TEMOVATE) 0.05 % cream Apply topically 2 times daily. 30 g 5    escitalopram (LEXAPRO) 10 MG tablet Take 1 tablet by mouth daily. 90 tablet 1    celecoxib (CeleBREX) 200 MG capsule Take 1 capsule by mouth daily. 90 capsule 3    nalTREXone (REVIA) 50 MG tablet Take 1 tablet by mouth daily. 30 tablet 5    zolpidem (AMBIEN CR) 12.5 MG CR tablet Take 1 tablet by mouth nightly as needed for Sleep. 30 tablet 5    sildenafil (Viagra) 100 MG tablet Take 1 tablet by mouth daily as needed for Erectile Dysfunction. 60 tablet 11    loratadine (CLARITIN) 10 MG tablet Take 1 tablet by mouth daily. 90 tablet 3    atenolol (TENORMIN) 25 MG tablet Take 1 tablet by mouth daily. 90 tablet 4    simvastatin (ZOCOR) 20 MG tablet Take 1 tablet by mouth daily. 90 tablet 4    ketoconazole (Nizoral) 2 % shampoo 1 topical application, twice weekly to affected area(s) 120 mL 11    thiamine (VITAMIN B1) 100 MG tablet Take 1 tablet by mouth daily. 30 tablet 11    aspirin 81 MG tablet Take 1 tablet by mouth daily.       No current facility-administered medications for this visit.       Allergies:  ALLERGIES:   Allergen Reactions    Aspirin RASH    Penicillins RASH    Azithromycin PRURITUS    Doxepin INSOMNIA    Lisinopril Other (See Comments)     photosensitivity    Quetiapine Other (See Comments)     confusion    Temazepam Other (See Comments)     Nightmares, sleep walking    Magnesium Citrate RASH       Past Medical  History/Surgeries:  Past Medical History:   Diagnosis Date    Acute sinusitis 10/3/2016    Agatston coronary artery calcium score greater than 400     867     Chronic back pain     Chronic daily headache     HTN (hypertension)     Inguinal hernia     left    Insomnia     Male hypogonadism 2/27/2015    Pharyngitis, acute 10/3/2016       Past Surgical History:   Procedure Laterality Date    Cologuard      refused 2020    Colonoscopy  diagnostic  04/29/2008    refused 2020, Bellin/normal/needs follow up in 10 years per Dr. Helm    Esophagogastroduodenoscopy transoral flex diag  04/29/2008    Bellin/normal per report     Inguinal hernia repair  01/29/2010       Family History:  Family History   Problem Relation Age of Onset    Cancer, Prostate Father 80       Social History:  Social History     Tobacco Use    Smoking status: Never    Smokeless tobacco: Never   Substance Use Topics    Alcohol use: Yes     Alcohol/week: 1.0 standard drink of alcohol     Types: 1 Standard drinks or equivalent per week     Comment: 1 per week       REVIEW OF SYSTEMS     Review of Systems   Constitutional: Negative.  Negative for activity change and appetite change.   Respiratory: Negative.  Negative for cough and shortness of breath.    Cardiovascular: Negative.  Negative for leg swelling.   Gastrointestinal: Negative.  Negative for nausea and vomiting.   Genitourinary: Negative.  Negative for difficulty urinating.   Psychiatric/Behavioral: Negative.  Negative for sleep disturbance.        PHYSICAL EXAM     Physical Exam    ASSESSMENT/PLAN     ASSESSMENT:   1. Intractable back pain    2. Lumbar disc herniation      PLAN:  1.  The pathophysiology of the patient's pain symptoms were discussed in detail. Different treatment options were discussed.   2. Counseled on non medical management of pain including heat, ice, stretching, TENS, exercises,  and non opiate medications.  3. Goals: Improved functionality  4.  Follow up prn  Orders Placed This Encounter    SERVICE TO PHYSICAL THERAPY     ASA: 2    Risks, benefits and alternative options discussed with the patient. All questions were answered to his complete satisfaction. Patient demonstrated complete understanding and agreed to proceed with the treatment plan.  Portions of this note may have been dictated using a voice-activated software. Appropriate corrections to the document have been made; however, mistakes in  wording may still exist.

## 2025-03-28 NOTE — OUTREACH NOTE
[facility]  Check-In    Current Estimated Gestational Age: 27w3d      Questions/Answers      Flowsheet Row Responses   Best Method for Contacting Cell   Demographics Reviewed Yes   Currently Employed No   Able to keep appointments as scheduled Yes   Gender(s) and Name(s) Boy-Shashi   Baby Active/Feeling Fetal Movemen Yes   How are you presently feeling? good   Questions regarding prenatal visits or tests to be ordered? No   Education related to new diagnoses/home equipment No                Alicia GUERRIER  Maternity Nurse Navigator    3/28/2025, 17:06 EDT        SDOH updated and reviewed with the patient during this program:  --     Alcohol Use: Not At Risk (3/24/2025)    AUDIT-C     Frequency of Alcohol Consumption: Never     Average Number of Drinks: Patient does not drink     Frequency of Binge Drinking: Never      --     Depression: Not at risk (3/24/2025)    PHQ-2     PHQ-2 Score: 0      --     Disabilities: At Risk (3/24/2025)    Disabilities     Concentrating, Remembering, or Making Decisions Difficulty: yes     Doing Errands Independently Difficulty: no      --     Employment: Not At Risk (3/24/2025)    Employment     Do you want help finding or keeping work or a job?: I do not need or want help      Financial Resource Strain: Medium Risk (3/24/2025)    Overall Financial Resource Strain (CARDIA)     Difficulty of Paying Living Expenses: Somewhat hard      --     Food Insecurity: Food Insecurity Present (3/24/2025)    Hunger Vital Sign     Worried About Running Out of Food in the Last Year: Sometimes true     Ran Out of Food in the Last Year: Sometimes true      --     Health Literacy: Not At Risk (3/24/2025)    Education     Help with school or training?: No     Preferred Language: English      --     Housing Stability: Not At Risk (3/24/2025)    Housing Stability     Current Living Arrangements: home     Potentially Unsafe Housing Conditions: none      --     Interpersonal Safety: Not At Risk  (3/24/2025)    Abuse Screen     Unsafe at Home or Work/School: no     Feels Threatened by Someone?: no     Does Anyone Keep You from Contacting Others or Doint Things Outside the Home?: no     Physical Sign of Abuse Present: no      --     Physical Activity: Insufficiently Active (3/24/2025)    Exercise Vital Sign     Days of Exercise per Week: 6 days     Minutes of Exercise per Session: 10 min      --     Social Connections: Not At Risk (3/24/2025)    Family and Community Support     Help with Day-to-Day Activities: I don't need any help     Lonely or Isolated: Never      --     Transportation Needs: Unmet Transportation Needs (3/24/2025)    PRAPARE - Transportation     Lack of Transportation (Medical): Yes     Lack of Transportation (Non-Medical): Yes      --     Utilities: Not At Risk (3/24/2025)    [facility] Utilities     Threatened with loss of utilities: No

## 2025-04-07 ENCOUNTER — ROUTINE PRENATAL (OUTPATIENT)
Dept: OBSTETRICS AND GYNECOLOGY | Facility: CLINIC | Age: 30
End: 2025-04-07
Payer: COMMERCIAL

## 2025-04-07 VITALS — SYSTOLIC BLOOD PRESSURE: 134 MMHG | WEIGHT: 293 LBS | DIASTOLIC BLOOD PRESSURE: 86 MMHG | BODY MASS INDEX: 53.07 KG/M2

## 2025-04-07 DIAGNOSIS — O10.919 HTN IN PREGNANCY, CHRONIC: ICD-10-CM

## 2025-04-07 DIAGNOSIS — Z34.83 PRENATAL CARE, SUBSEQUENT PREGNANCY IN THIRD TRIMESTER: Primary | ICD-10-CM

## 2025-04-07 DIAGNOSIS — E66.01 MORBID OBESITY WITH BMI OF 50.0-59.9, ADULT: ICD-10-CM

## 2025-04-07 DIAGNOSIS — Z3A.29 29 WEEKS GESTATION OF PREGNANCY: ICD-10-CM

## 2025-04-07 NOTE — PROGRESS NOTES
Prenatal Care Visit    Subjective   Chief Complaint   Patient presents with    Routine Prenatal Visit     Glucola done today, C/o bad heartburn       History:   Valentina is a  currently at 29w3d who presents for a prenatal care visit today.    No major issues today.    Social History    Tobacco Use      Smoking status: Every Day        Packs/day: 1.00        Years: 1 pack/day for 11.3 years (11.3 ttl pk-yrs)        Types: Cigarettes        Start date: 2014      Smokeless tobacco: Never       Objective   /86   Wt (!) 140 kg (309 lb 3.2 oz)   BMI 53.07 kg/m²   Physical Exam:  Normal, gestational age-appropriate exam today        Plan   Medical Decision Making:    I have reviewed the prenatal labs and ultrasound(s) today. I have reviewed the most recent prenatal progress note(s).    Diagnosis: Supervision of high risk pregnancy  Chronic HTN in pregnancy  Methadone in pregnancy  BMI 53  Incomplete anatomy  Breech presentation   Tests/Orders/Rx today: Orders Placed This Encounter   Procedures    Gestational Screen 1 Hr ([facility])     Release to patient:   Routine Release [[id number]]    CBC & Differential     Manual Differential:   No     Release to patient:   Routine Release [[id number]]       Medication Management: None     Topics discussed: Prenatal care milestones  kick counts and fetal movement  PIH precautions   labor signs and symptoms  She has a visit with [facility] on 4/15   Tests next visit: none   Next visit: 2 week(s)     Virgilio Schaefer MD  Obstetrics and Gynecology  [facility]

## 2025-04-08 LAB
BASOPHILS # BLD AUTO: 0.02 10*3/MM3 (ref 0–0.2)
BASOPHILS NFR BLD AUTO: 0.2 % (ref 0–1.5)
EOSINOPHIL # BLD AUTO: 0.11 10*3/MM3 (ref 0–0.4)
EOSINOPHIL NFR BLD AUTO: 1 % (ref 0.3–6.2)
ERYTHROCYTE [DISTWIDTH] IN BLOOD BY AUTOMATED COUNT: 12 % (ref 12.3–15.4)
GLUCOSE 1H P 50 G GLC PO SERPL-MCNC: 101 MG/DL (ref 65–139)
HCT VFR BLD AUTO: 35.4 % (ref 34–46.6)
HGB BLD-MCNC: 11.8 G/DL (ref 12–15.9)
IMM GRANULOCYTES # BLD AUTO: 0.07 10*3/MM3 (ref 0–0.05)
IMM GRANULOCYTES NFR BLD AUTO: 0.7 % (ref 0–0.5)
LYMPHOCYTES # BLD AUTO: 1.72 10*3/MM3 (ref 0.7–3.1)
LYMPHOCYTES NFR BLD AUTO: 16 % (ref 19.6–45.3)
MCH RBC QN AUTO: 32.3 PG (ref 26.6–33)
MCHC RBC AUTO-ENTMCNC: 33.3 G/DL (ref 31.5–35.7)
MCV RBC AUTO: 97 FL (ref 79–97)
MONOCYTES # BLD AUTO: 0.7 10*3/MM3 (ref 0.1–0.9)
MONOCYTES NFR BLD AUTO: 6.5 % (ref 5–12)
NEUTROPHILS # BLD AUTO: 8.1 10*3/MM3 (ref 1.7–7)
NEUTROPHILS NFR BLD AUTO: 75.6 % (ref 42.7–76)
NRBC BLD AUTO-RTO: 0 /100 WBC (ref 0–0.2)
PLATELET # BLD AUTO: 245 10*3/MM3 (ref 140–450)
RBC # BLD AUTO: 3.65 10*6/MM3 (ref 3.77–5.28)
WBC # BLD AUTO: 10.72 10*3/MM3 (ref 3.4–10.8)

## 2025-05-20 ENCOUNTER — HOSPITAL ENCOUNTER (INPATIENT)
Facility: HOSPITAL | Age: 30
LOS: 3 days | Discharge: HOME | End: 2025-05-23
Attending: MIDWIFE | Admitting: MIDWIFE
Payer: COMMERCIAL

## 2025-05-20 ENCOUNTER — ROUTINE PRENATAL (OUTPATIENT)
Dept: OBSTETRICS AND GYNECOLOGY | Facility: CLINIC | Age: 30
End: 2025-05-20
Payer: COMMERCIAL

## 2025-05-20 ENCOUNTER — ANESTHESIA EVENT (OUTPATIENT)
Dept: PERIOP | Facility: HOSPITAL | Age: 30
End: 2025-05-20
Payer: COMMERCIAL

## 2025-05-20 ENCOUNTER — ANESTHESIA (OUTPATIENT)
Dept: PERIOP | Facility: HOSPITAL | Age: 30
End: 2025-05-20
Payer: COMMERCIAL

## 2025-05-20 ENCOUNTER — PATIENT OUTREACH (OUTPATIENT)
Dept: LABOR AND DELIVERY | Facility: HOSPITAL | Age: 30
End: 2025-05-20
Payer: COMMERCIAL

## 2025-05-20 VITALS — DIASTOLIC BLOOD PRESSURE: 84 MMHG | BODY MASS INDEX: 51.6 KG/M2 | SYSTOLIC BLOOD PRESSURE: 128 MMHG | WEIGHT: 293 LBS

## 2025-05-20 DIAGNOSIS — O10.919 HTN IN PREGNANCY, CHRONIC: ICD-10-CM

## 2025-05-20 DIAGNOSIS — O09.30 LIMITED PRENATAL CARE, ANTEPARTUM: ICD-10-CM

## 2025-05-20 DIAGNOSIS — O99.320 METHADONE MAINTENANCE TREATMENT AFFECTING PREGNANCY, ANTEPARTUM: ICD-10-CM

## 2025-05-20 DIAGNOSIS — O09.93 SUPERVISION OF HIGH RISK PREGNANCY IN THIRD TRIMESTER: Primary | ICD-10-CM

## 2025-05-20 DIAGNOSIS — O28.3 ABNORMAL FETAL ULTRASOUND: ICD-10-CM

## 2025-05-20 DIAGNOSIS — O32.8XX1 MATERNAL CARE FOR OTHER MALPRESENTATION OF FETUS, FETUS 1: Primary | ICD-10-CM

## 2025-05-20 DIAGNOSIS — F11.20 METHADONE MAINTENANCE TREATMENT AFFECTING PREGNANCY, ANTEPARTUM: ICD-10-CM

## 2025-05-20 PROBLEM — Z98.891 S/P CESAREAN SECTION: Status: ACTIVE | Noted: 2025-05-20

## 2025-05-20 PROBLEM — Z34.90 PREGNANCY: Status: ACTIVE | Noted: 2025-05-20

## 2025-05-20 PROBLEM — Z34.90 PREGNANCY: Status: RESOLVED | Noted: 2025-05-20 | Resolved: 2025-05-20

## 2025-05-20 LAB
ABO GROUP BLD: NORMAL
ALBUMIN SERPL-MCNC: 3.4 G/DL (ref 3.5–5.2)
ALBUMIN/GLOB SERPL: 1 G/DL
ALP SERPL-CCNC: 115 U/L (ref 39–117)
ALT SERPL W P-5'-P-CCNC: 19 U/L (ref 1–33)
AMPHET+METHAMPHET UR QL: NEGATIVE
AMPHETAMINES UR QL: NEGATIVE
ANION GAP SERPL CALCULATED.3IONS-SCNC: 13.4 MMOL/L (ref 5–15)
AST SERPL-CCNC: 29 U/L (ref 1–32)
BARBITURATES UR QL SCN: NEGATIVE
BASOPHILS # BLD AUTO: 0.03 10*3/MM3 (ref 0–0.2)
BASOPHILS NFR BLD AUTO: 0.4 % (ref 0–1.5)
BENZODIAZ UR QL SCN: NEGATIVE
BILIRUB BLD-MCNC: NEGATIVE MG/DL
BILIRUB SERPL-MCNC: 0.7 MG/DL (ref 0–1.2)
BILIRUB UR QL STRIP: NEGATIVE
BLD GP AB SCN SERPL QL: NEGATIVE
BUN SERPL-MCNC: 6 MG/DL (ref 6–20)
BUN/CREAT SERPL: 9.8 (ref 7–25)
BUPRENORPHINE SERPL-MCNC: NEGATIVE NG/ML
CALCIUM SPEC-SCNC: 9.1 MG/DL (ref 8.6–10.5)
CANNABINOIDS SERPL QL: NEGATIVE
CHLORIDE SERPL-SCNC: 105 MMOL/L (ref 98–107)
CLARITY UR: CLEAR
CLARITY, POC: CLEAR
CO2 SERPL-SCNC: 18.6 MMOL/L (ref 22–29)
COCAINE UR QL: NEGATIVE
COLOR UR: NORMAL
COLOR UR: YELLOW
CREAT SERPL-MCNC: 0.61 MG/DL (ref 0.57–1)
CREAT UR-MCNC: 144.2 MG/DL
DEPRECATED RDW RBC AUTO: 41.8 FL (ref 37–54)
EGFRCR SERPLBLD CKD-EPI 2021: 122.8 ML/MIN/1.73
EOSINOPHIL # BLD AUTO: 0.09 10*3/MM3 (ref 0–0.4)
EOSINOPHIL NFR BLD AUTO: 1.1 % (ref 0.3–6.2)
ERYTHROCYTE [DISTWIDTH] IN BLOOD BY AUTOMATED COUNT: 12.2 % (ref 12.3–15.4)
FENTANYL UR-MCNC: NEGATIVE NG/ML
GLOBULIN UR ELPH-MCNC: 3.4 GM/DL
GLUCOSE SERPL-MCNC: 85 MG/DL (ref 65–99)
GLUCOSE UR STRIP-MCNC: NEGATIVE MG/DL
GLUCOSE UR STRIP-MCNC: NEGATIVE MG/DL
HCT VFR BLD AUTO: 33.3 % (ref 34–46.6)
HGB BLD-MCNC: 11.7 G/DL (ref 12–15.9)
HGB UR QL STRIP.AUTO: NEGATIVE
IMM GRANULOCYTES # BLD AUTO: 0.03 10*3/MM3 (ref 0–0.05)
IMM GRANULOCYTES NFR BLD AUTO: 0.4 % (ref 0–0.5)
KETONES UR QL STRIP: NEGATIVE
KETONES UR QL: NEGATIVE
LEUKOCYTE EST, POC: NEGATIVE
LEUKOCYTE ESTERASE UR QL STRIP.AUTO: NEGATIVE
LYMPHOCYTES # BLD AUTO: 1.91 10*3/MM3 (ref 0.7–3.1)
LYMPHOCYTES NFR BLD AUTO: 22.8 % (ref 19.6–45.3)
MCH RBC QN AUTO: 32.7 PG (ref 26.6–33)
MCHC RBC AUTO-ENTMCNC: 35.1 G/DL (ref 31.5–35.7)
MCV RBC AUTO: 93 FL (ref 79–97)
METHADONE UR QL SCN: POSITIVE
MONOCYTES # BLD AUTO: 0.61 10*3/MM3 (ref 0.1–0.9)
MONOCYTES NFR BLD AUTO: 7.3 % (ref 5–12)
NEUTROPHILS NFR BLD AUTO: 5.69 10*3/MM3 (ref 1.7–7)
NEUTROPHILS NFR BLD AUTO: 68 % (ref 42.7–76)
NITRITE UR QL STRIP: NEGATIVE
NITRITE UR-MCNC: NEGATIVE MG/ML
NRBC BLD AUTO-RTO: 0 /100 WBC (ref 0–0.2)
OPIATES UR QL: NEGATIVE
OXYCODONE UR QL SCN: NEGATIVE
PCP UR QL SCN: NEGATIVE
PH UR STRIP.AUTO: 7.5 [PH] (ref 5–8)
PH UR: 7 [PH] (ref 5–8)
PLATELET # BLD AUTO: 202 10*3/MM3 (ref 140–450)
PMV BLD AUTO: 12 FL (ref 6–12)
POTASSIUM SERPL-SCNC: 4.6 MMOL/L (ref 3.5–5.2)
PROT ?TM UR-MCNC: 13 MG/DL
PROT SERPL-MCNC: 6.8 G/DL (ref 6–8.5)
PROT UR QL STRIP: NEGATIVE
PROT UR STRIP-MCNC: NEGATIVE MG/DL
PROT/CREAT UR: 90.2 MG/G CREA (ref 0–200)
RBC # BLD AUTO: 3.58 10*6/MM3 (ref 3.77–5.28)
RBC # UR STRIP: NEGATIVE /UL
RH BLD: POSITIVE
SODIUM SERPL-SCNC: 137 MMOL/L (ref 136–145)
SP GR UR STRIP: 1.02 (ref 1–1.03)
SP GR UR: 1.01 (ref 1–1.03)
T&S EXPIRATION DATE: NORMAL
TREPONEMA PALLIDUM IGG+IGM AB [PRESENCE] IN SERUM OR PLASMA BY IMMUNOASSAY: NORMAL
TRICYCLICS UR QL SCN: NEGATIVE
UROBILINOGEN UR QL STRIP: NORMAL
UROBILINOGEN UR QL: NORMAL
WBC NRBC COR # BLD AUTO: 8.36 10*3/MM3 (ref 3.4–10.8)

## 2025-05-20 PROCEDURE — 80307 DRUG TEST PRSMV CHEM ANLYZR: CPT | Performed by: OBSTETRICS & GYNECOLOGY

## 2025-05-20 PROCEDURE — 80053 COMPREHEN METABOLIC PANEL: CPT | Performed by: OBSTETRICS & GYNECOLOGY

## 2025-05-20 PROCEDURE — 25010000002 ONDANSETRON PER 1 MG: Performed by: NURSE ANESTHETIST, CERTIFIED REGISTERED

## 2025-05-20 PROCEDURE — 25010000002 FAMOTIDINE 10 MG/ML SOLUTION: Performed by: MIDWIFE

## 2025-05-20 PROCEDURE — 86850 RBC ANTIBODY SCREEN: CPT | Performed by: MIDWIFE

## 2025-05-20 PROCEDURE — 25810000003 LACTATED RINGERS PER 1000 ML: Performed by: MIDWIFE

## 2025-05-20 PROCEDURE — 59025 FETAL NON-STRESS TEST: CPT

## 2025-05-20 PROCEDURE — 59515 CESAREAN DELIVERY: CPT | Performed by: OBSTETRICS & GYNECOLOGY

## 2025-05-20 PROCEDURE — 25810000003 LACTATED RINGERS SOLUTION: Performed by: MIDWIFE

## 2025-05-20 PROCEDURE — 25010000002 CEFAZOLIN 3 G RECONSTITUTED SOLUTION 1 EACH VIAL: Performed by: MIDWIFE

## 2025-05-20 PROCEDURE — 25010000002 MORPHINE PER 10 MG: Performed by: NURSE ANESTHETIST, CERTIFIED REGISTERED

## 2025-05-20 PROCEDURE — 25010000002 KETOROLAC TROMETHAMINE PER 15 MG: Performed by: MIDWIFE

## 2025-05-20 PROCEDURE — 25010000002 BUPIVACAINE PF 0.75 % SOLUTION: Performed by: NURSE ANESTHETIST, CERTIFIED REGISTERED

## 2025-05-20 PROCEDURE — 25010000002 BETAMETHASONE ACET & SOD PHOS PER 4 MG: Performed by: OBSTETRICS & GYNECOLOGY

## 2025-05-20 PROCEDURE — 82570 ASSAY OF URINE CREATININE: CPT | Performed by: OBSTETRICS & GYNECOLOGY

## 2025-05-20 PROCEDURE — 25010000002 PHENYLEPHRINE 10 MG/ML SOLUTION: Performed by: NURSE ANESTHETIST, CERTIFIED REGISTERED

## 2025-05-20 PROCEDURE — 84156 ASSAY OF PROTEIN URINE: CPT | Performed by: OBSTETRICS & GYNECOLOGY

## 2025-05-20 PROCEDURE — 81002 URINALYSIS NONAUTO W/O SCOPE: CPT | Performed by: MIDWIFE

## 2025-05-20 PROCEDURE — 85025 COMPLETE CBC W/AUTO DIFF WBC: CPT | Performed by: MIDWIFE

## 2025-05-20 PROCEDURE — S0260 H&P FOR SURGERY: HCPCS | Performed by: OBSTETRICS & GYNECOLOGY

## 2025-05-20 PROCEDURE — G0463 HOSPITAL OUTPT CLINIC VISIT: HCPCS

## 2025-05-20 PROCEDURE — 86900 BLOOD TYPING SEROLOGIC ABO: CPT | Performed by: MIDWIFE

## 2025-05-20 PROCEDURE — 86780 TREPONEMA PALLIDUM: CPT | Performed by: MIDWIFE

## 2025-05-20 PROCEDURE — 25010000002 FENTANYL CITRATE (PF) 50 MCG/ML SOLUTION: Performed by: NURSE ANESTHETIST, CERTIFIED REGISTERED

## 2025-05-20 PROCEDURE — 81003 URINALYSIS AUTO W/O SCOPE: CPT | Performed by: MIDWIFE

## 2025-05-20 PROCEDURE — 86901 BLOOD TYPING SEROLOGIC RH(D): CPT | Performed by: MIDWIFE

## 2025-05-20 DEVICE — ABSORBABLE HEMOSTAT (OXIDIZED REGENERATED CELLULOSE)
Type: IMPLANTABLE DEVICE | Site: ABDOMEN | Status: FUNCTIONAL
Brand: SURGICEL

## 2025-05-20 RX ORDER — ONDANSETRON 2 MG/ML
4 INJECTION INTRAMUSCULAR; INTRAVENOUS EVERY 6 HOURS PRN
Status: DISCONTINUED | OUTPATIENT
Start: 2025-05-20 | End: 2025-05-23 | Stop reason: HOSPADM

## 2025-05-20 RX ORDER — MORPHINE SULFATE 2 MG/ML
2 INJECTION, SOLUTION INTRAMUSCULAR; INTRAVENOUS
Status: ACTIVE | OUTPATIENT
Start: 2025-05-20 | End: 2025-05-21

## 2025-05-20 RX ORDER — ACETAMINOPHEN 500 MG
1000 TABLET ORAL EVERY 8 HOURS
Status: DISCONTINUED | OUTPATIENT
Start: 2025-05-21 | End: 2025-05-23 | Stop reason: HOSPADM

## 2025-05-20 RX ORDER — NICOTINE 21 MG/24HR
1 PATCH, TRANSDERMAL 24 HOURS TRANSDERMAL EVERY 24 HOURS
Status: DISCONTINUED | OUTPATIENT
Start: 2025-05-20 | End: 2025-05-20 | Stop reason: SDUPTHER

## 2025-05-20 RX ORDER — CARBOPROST TROMETHAMINE 250 UG/ML
250 INJECTION, SOLUTION INTRAMUSCULAR AS NEEDED
Status: DISCONTINUED | OUTPATIENT
Start: 2025-05-20 | End: 2025-05-20 | Stop reason: HOSPADM

## 2025-05-20 RX ORDER — SODIUM CHLORIDE 0.9 % (FLUSH) 0.9 %
10 SYRINGE (ML) INJECTION AS NEEDED
Status: DISCONTINUED | OUTPATIENT
Start: 2025-05-20 | End: 2025-05-20

## 2025-05-20 RX ORDER — MISOPROSTOL 200 UG/1
800 TABLET ORAL AS NEEDED
Status: DISCONTINUED | OUTPATIENT
Start: 2025-05-20 | End: 2025-05-20 | Stop reason: HOSPADM

## 2025-05-20 RX ORDER — OXYTOCIN/0.9 % SODIUM CHLORIDE 30/500 ML
42 PLASTIC BAG, INJECTION (ML) INTRAVENOUS AS NEEDED
Status: DISCONTINUED | OUTPATIENT
Start: 2025-05-20 | End: 2025-05-20 | Stop reason: HOSPADM

## 2025-05-20 RX ORDER — ONDANSETRON 4 MG/1
4 TABLET, ORALLY DISINTEGRATING ORAL EVERY 8 HOURS PRN
Status: DISCONTINUED | OUTPATIENT
Start: 2025-05-20 | End: 2025-05-23 | Stop reason: HOSPADM

## 2025-05-20 RX ORDER — DIPHENHYDRAMINE HYDROCHLORIDE 50 MG/ML
25 INJECTION, SOLUTION INTRAMUSCULAR; INTRAVENOUS EVERY 4 HOURS PRN
Status: DISCONTINUED | OUTPATIENT
Start: 2025-05-20 | End: 2025-05-23 | Stop reason: HOSPADM

## 2025-05-20 RX ORDER — PROMETHAZINE HYDROCHLORIDE 12.5 MG/1
12.5 SUPPOSITORY RECTAL EVERY 6 HOURS PRN
Status: DISCONTINUED | OUTPATIENT
Start: 2025-05-20 | End: 2025-05-23 | Stop reason: HOSPADM

## 2025-05-20 RX ORDER — SODIUM CHLORIDE 0.9 % (FLUSH) 0.9 %
10 SYRINGE (ML) INJECTION EVERY 12 HOURS SCHEDULED
Status: DISCONTINUED | OUTPATIENT
Start: 2025-05-20 | End: 2025-05-20

## 2025-05-20 RX ORDER — NICOTINE 21 MG/24HR
1 PATCH, TRANSDERMAL 24 HOURS TRANSDERMAL EVERY 24 HOURS
Status: DISCONTINUED | OUTPATIENT
Start: 2025-05-20 | End: 2025-05-23 | Stop reason: HOSPADM

## 2025-05-20 RX ORDER — PHENYLEPHRINE HYDROCHLORIDE 10 MG/ML
INJECTION INTRAVENOUS AS NEEDED
Status: DISCONTINUED | OUTPATIENT
Start: 2025-05-20 | End: 2025-05-20 | Stop reason: SURG

## 2025-05-20 RX ORDER — PROMETHAZINE HYDROCHLORIDE 25 MG/1
25 TABLET ORAL EVERY 6 HOURS PRN
Status: DISCONTINUED | OUTPATIENT
Start: 2025-05-20 | End: 2025-05-23 | Stop reason: HOSPADM

## 2025-05-20 RX ORDER — ACETAMINOPHEN 500 MG
1000 TABLET ORAL ONCE
Status: COMPLETED | OUTPATIENT
Start: 2025-05-20 | End: 2025-05-20

## 2025-05-20 RX ORDER — ENOXAPARIN SODIUM 100 MG/ML
40 INJECTION SUBCUTANEOUS EVERY 12 HOURS
Status: DISCONTINUED | OUTPATIENT
Start: 2025-05-21 | End: 2025-05-23 | Stop reason: HOSPADM

## 2025-05-20 RX ORDER — METHYLERGONOVINE MALEATE 0.2 MG/ML
200 INJECTION INTRAVENOUS ONCE AS NEEDED
Status: DISCONTINUED | OUTPATIENT
Start: 2025-05-20 | End: 2025-05-20 | Stop reason: HOSPADM

## 2025-05-20 RX ORDER — METHADONE HYDROCHLORIDE 10 MG/1
10 TABLET ORAL DAILY
Status: DISCONTINUED | OUTPATIENT
Start: 2025-05-21 | End: 2025-05-21

## 2025-05-20 RX ORDER — SODIUM CHLORIDE 9 MG/ML
40 INJECTION, SOLUTION INTRAVENOUS AS NEEDED
Status: DISCONTINUED | OUTPATIENT
Start: 2025-05-20 | End: 2025-05-20

## 2025-05-20 RX ORDER — METOCLOPRAMIDE HYDROCHLORIDE 5 MG/ML
10 INJECTION INTRAMUSCULAR; INTRAVENOUS EVERY 4 HOURS PRN
Status: ACTIVE | OUTPATIENT
Start: 2025-05-20 | End: 2025-05-21

## 2025-05-20 RX ORDER — LIDOCAINE HYDROCHLORIDE 10 MG/ML
0.5 INJECTION, SOLUTION INFILTRATION; PERINEURAL ONCE AS NEEDED
Status: DISCONTINUED | OUTPATIENT
Start: 2025-05-20 | End: 2025-05-20

## 2025-05-20 RX ORDER — OXYTOCIN/0.9 % SODIUM CHLORIDE 30/500 ML
125 PLASTIC BAG, INJECTION (ML) INTRAVENOUS ONCE AS NEEDED
Status: DISCONTINUED | OUTPATIENT
Start: 2025-05-20 | End: 2025-05-23 | Stop reason: HOSPADM

## 2025-05-20 RX ORDER — SODIUM CHLORIDE, SODIUM LACTATE, POTASSIUM CHLORIDE, CALCIUM CHLORIDE 600; 310; 30; 20 MG/100ML; MG/100ML; MG/100ML; MG/100ML
125 INJECTION, SOLUTION INTRAVENOUS CONTINUOUS
Status: DISCONTINUED | OUTPATIENT
Start: 2025-05-20 | End: 2025-05-20

## 2025-05-20 RX ORDER — DIPHENHYDRAMINE HCL 25 MG
25 CAPSULE ORAL EVERY 4 HOURS PRN
Status: DISCONTINUED | OUTPATIENT
Start: 2025-05-20 | End: 2025-05-23 | Stop reason: HOSPADM

## 2025-05-20 RX ORDER — MORPHINE SULFATE 1 MG/ML
INJECTION, SOLUTION EPIDURAL; INTRATHECAL; INTRAVENOUS AS NEEDED
Status: DISCONTINUED | OUTPATIENT
Start: 2025-05-20 | End: 2025-05-20 | Stop reason: SURG

## 2025-05-20 RX ORDER — EPHEDRINE SULFATE 50 MG/ML
INJECTION INTRAVENOUS AS NEEDED
Status: DISCONTINUED | OUTPATIENT
Start: 2025-05-20 | End: 2025-05-20 | Stop reason: SURG

## 2025-05-20 RX ORDER — METHADONE HYDROCHLORIDE 10 MG/ML
76 CONCENTRATE ORAL DAILY
COMMUNITY
Start: 2025-05-14 | End: 2025-05-26

## 2025-05-20 RX ORDER — NICOTINE 21 MG/24HR
1 PATCH, TRANSDERMAL 24 HOURS TRANSDERMAL EVERY 24 HOURS
Status: DISCONTINUED | OUTPATIENT
Start: 2025-05-20 | End: 2025-05-20

## 2025-05-20 RX ORDER — IBUPROFEN 800 MG/1
800 TABLET, FILM COATED ORAL EVERY 8 HOURS
Status: DISCONTINUED | OUTPATIENT
Start: 2025-05-20 | End: 2025-05-23 | Stop reason: HOSPADM

## 2025-05-20 RX ORDER — MAGNESIUM HYDROXIDE 1200 MG/15ML
LIQUID ORAL AS NEEDED
Status: DISCONTINUED | OUTPATIENT
Start: 2025-05-20 | End: 2025-05-20 | Stop reason: HOSPADM

## 2025-05-20 RX ORDER — KETOROLAC TROMETHAMINE 30 MG/ML
30 INJECTION, SOLUTION INTRAMUSCULAR; INTRAVENOUS ONCE
Status: COMPLETED | OUTPATIENT
Start: 2025-05-20 | End: 2025-05-20

## 2025-05-20 RX ORDER — NICOTINE 21 MG/24HR
1 PATCH, TRANSDERMAL 24 HOURS TRANSDERMAL EVERY 24 HOURS
Status: DISCONTINUED | OUTPATIENT
Start: 2025-05-20 | End: 2025-05-20 | Stop reason: DRUGHIGH

## 2025-05-20 RX ORDER — PRENATAL VIT/IRON FUM/FOLIC AC 27MG-0.8MG
1 TABLET ORAL DAILY
Status: DISCONTINUED | OUTPATIENT
Start: 2025-05-21 | End: 2025-05-23 | Stop reason: HOSPADM

## 2025-05-20 RX ORDER — ONDANSETRON 2 MG/ML
INJECTION INTRAMUSCULAR; INTRAVENOUS AS NEEDED
Status: DISCONTINUED | OUTPATIENT
Start: 2025-05-20 | End: 2025-05-20 | Stop reason: SURG

## 2025-05-20 RX ORDER — FENTANYL CITRATE 0.05 MG/ML
INJECTION, SOLUTION INTRAMUSCULAR; INTRAVENOUS AS NEEDED
Status: DISCONTINUED | OUTPATIENT
Start: 2025-05-20 | End: 2025-05-20 | Stop reason: SURG

## 2025-05-20 RX ORDER — FAMOTIDINE 10 MG/ML
20 INJECTION, SOLUTION INTRAVENOUS ONCE
Status: COMPLETED | OUTPATIENT
Start: 2025-05-20 | End: 2025-05-20

## 2025-05-20 RX ORDER — ONDANSETRON 2 MG/ML
4 INJECTION INTRAMUSCULAR; INTRAVENOUS ONCE AS NEEDED
Status: ACTIVE | OUTPATIENT
Start: 2025-05-20 | End: 2025-05-21

## 2025-05-20 RX ORDER — CITRIC ACID/SODIUM CITRATE 334-500MG
30 SOLUTION, ORAL ORAL ONCE
Status: COMPLETED | OUTPATIENT
Start: 2025-05-20 | End: 2025-05-20

## 2025-05-20 RX ORDER — HYDROCORTISONE 25 MG/G
CREAM TOPICAL 3 TIMES DAILY PRN
Status: DISCONTINUED | OUTPATIENT
Start: 2025-05-20 | End: 2025-05-23 | Stop reason: HOSPADM

## 2025-05-20 RX ORDER — HYDROXYZINE HYDROCHLORIDE 25 MG/1
50 TABLET, FILM COATED ORAL EVERY 6 HOURS PRN
Status: DISCONTINUED | OUTPATIENT
Start: 2025-05-20 | End: 2025-05-23 | Stop reason: HOSPADM

## 2025-05-20 RX ORDER — BUPIVACAINE HYDROCHLORIDE 7.5 MG/ML
INJECTION, SOLUTION EPIDURAL; RETROBULBAR
Status: COMPLETED | OUTPATIENT
Start: 2025-05-20 | End: 2025-05-20

## 2025-05-20 RX ORDER — BETAMETHASONE SODIUM PHOSPHATE AND BETAMETHASONE ACETATE 3; 3 MG/ML; MG/ML
12 INJECTION, SUSPENSION INTRA-ARTICULAR; INTRALESIONAL; INTRAMUSCULAR; SOFT TISSUE EVERY 24 HOURS
Status: DISCONTINUED | OUTPATIENT
Start: 2025-05-20 | End: 2025-05-20

## 2025-05-20 RX ORDER — OXYCODONE HYDROCHLORIDE 5 MG/1
5 TABLET ORAL EVERY 4 HOURS PRN
Status: DISCONTINUED | OUTPATIENT
Start: 2025-05-20 | End: 2025-05-23 | Stop reason: HOSPADM

## 2025-05-20 RX ORDER — PRENATAL VIT/IRON FUM/FOLIC AC 27MG-0.8MG
TABLET ORAL
COMMUNITY
Start: 2025-05-16

## 2025-05-20 RX ORDER — BUPIVACAINE HYDROCHLORIDE 7.5 MG/ML
INJECTION, SOLUTION EPIDURAL; RETROBULBAR AS NEEDED
Status: DISCONTINUED | OUTPATIENT
Start: 2025-05-20 | End: 2025-05-20 | Stop reason: SURG

## 2025-05-20 RX ORDER — SIMETHICONE 80 MG
80 TABLET,CHEWABLE ORAL 4 TIMES DAILY PRN
Status: DISCONTINUED | OUTPATIENT
Start: 2025-05-20 | End: 2025-05-23 | Stop reason: HOSPADM

## 2025-05-20 RX ORDER — OXYTOCIN/0.9 % SODIUM CHLORIDE 30/500 ML
PLASTIC BAG, INJECTION (ML) INTRAVENOUS CONTINUOUS PRN
Status: DISCONTINUED | OUTPATIENT
Start: 2025-05-20 | End: 2025-05-20 | Stop reason: SURG

## 2025-05-20 RX ORDER — OXYTOCIN/0.9 % SODIUM CHLORIDE 30/500 ML
333 PLASTIC BAG, INJECTION (ML) INTRAVENOUS ONCE
Status: DISCONTINUED | OUTPATIENT
Start: 2025-05-20 | End: 2025-05-20 | Stop reason: HOSPADM

## 2025-05-20 RX ORDER — OXYCODONE HYDROCHLORIDE 5 MG/1
10 TABLET ORAL EVERY 4 HOURS PRN
Status: DISCONTINUED | OUTPATIENT
Start: 2025-05-20 | End: 2025-05-23 | Stop reason: HOSPADM

## 2025-05-20 RX ADMIN — NICOTINE POLACRILEX 2 MG: 2 GUM, CHEWING BUCCAL at 15:15

## 2025-05-20 RX ADMIN — FAMOTIDINE 20 MG: 10 INJECTION, SOLUTION INTRAVENOUS at 17:29

## 2025-05-20 RX ADMIN — SODIUM CHLORIDE, POTASSIUM CHLORIDE, SODIUM LACTATE AND CALCIUM CHLORIDE 1000 ML: 600; 310; 30; 20 INJECTION, SOLUTION INTRAVENOUS at 17:29

## 2025-05-20 RX ADMIN — Medication 30 ML/HR: at 18:57

## 2025-05-20 RX ADMIN — PHENYLEPHRINE HYDROCHLORIDE 100 MCG: 10 INJECTION INTRAVENOUS at 18:38

## 2025-05-20 RX ADMIN — FENTANYL CITRATE 10 MCG: 0.05 INJECTION, SOLUTION INTRAMUSCULAR; INTRAVENOUS at 18:20

## 2025-05-20 RX ADMIN — PHENYLEPHRINE HYDROCHLORIDE 100 MCG: 10 INJECTION INTRAVENOUS at 18:48

## 2025-05-20 RX ADMIN — KETOROLAC TROMETHAMINE 30 MG: 30 INJECTION, SOLUTION INTRAMUSCULAR; INTRAVENOUS at 20:14

## 2025-05-20 RX ADMIN — EPHEDRINE SULFATE 5 MG: 50 INJECTION INTRAVENOUS at 18:45

## 2025-05-20 RX ADMIN — ACETAMINOPHEN 1000 MG: 500 TABLET ORAL at 13:57

## 2025-05-20 RX ADMIN — ONDANSETRON 4 MG: 2 INJECTION INTRAMUSCULAR; INTRAVENOUS at 18:23

## 2025-05-20 RX ADMIN — SODIUM CITRATE AND CITRIC ACID MONOHYDRATE 30 ML: 500; 334 SOLUTION ORAL at 17:29

## 2025-05-20 RX ADMIN — OXYCODONE 10 MG: 5 TABLET ORAL at 23:19

## 2025-05-20 RX ADMIN — NICOTINE 1 PATCH: 21 PATCH TRANSDERMAL at 23:32

## 2025-05-20 RX ADMIN — PHENYLEPHRINE HYDROCHLORIDE 100 MCG: 10 INJECTION INTRAVENOUS at 18:42

## 2025-05-20 RX ADMIN — SODIUM CHLORIDE, POTASSIUM CHLORIDE, SODIUM LACTATE AND CALCIUM CHLORIDE: 600; 310; 30; 20 INJECTION, SOLUTION INTRAVENOUS at 18:07

## 2025-05-20 RX ADMIN — EPHEDRINE SULFATE 5 MG: 50 INJECTION INTRAVENOUS at 18:23

## 2025-05-20 RX ADMIN — BETAMETHASONE SODIUM PHOSPHATE AND BETAMETHASONE ACETATE 12 MG: 3; 3 INJECTION, SUSPENSION INTRA-ARTICULAR; INTRALESIONAL; INTRAMUSCULAR at 12:41

## 2025-05-20 RX ADMIN — BUPIVACAINE HYDROCHLORIDE 1.6 ML: 7.5 INJECTION, SOLUTION EPIDURAL; RETROBULBAR at 18:20

## 2025-05-20 RX ADMIN — NICOTINE POLACRILEX 2 MG: 2 GUM, CHEWING BUCCAL at 16:26

## 2025-05-20 RX ADMIN — EPHEDRINE SULFATE 5 MG: 50 INJECTION INTRAVENOUS at 18:33

## 2025-05-20 RX ADMIN — PHENYLEPHRINE HYDROCHLORIDE 100 MCG: 10 INJECTION INTRAVENOUS at 18:33

## 2025-05-20 RX ADMIN — EPHEDRINE SULFATE 5 MG: 50 INJECTION INTRAVENOUS at 18:38

## 2025-05-20 RX ADMIN — PHENYLEPHRINE HYDROCHLORIDE 100 MCG: 10 INJECTION INTRAVENOUS at 18:23

## 2025-05-20 RX ADMIN — EPHEDRINE SULFATE 5 MG: 50 INJECTION INTRAVENOUS at 18:55

## 2025-05-20 RX ADMIN — EPHEDRINE SULFATE 5 MG: 50 INJECTION INTRAVENOUS at 18:28

## 2025-05-20 RX ADMIN — MORPHINE SULFATE 0.2 MG: 1 INJECTION EPIDURAL; INTRATHECAL; INTRAVENOUS at 18:20

## 2025-05-20 RX ADMIN — SODIUM CHLORIDE 3000 MG: 9 INJECTION, SOLUTION INTRAVENOUS at 18:07

## 2025-05-20 RX ADMIN — PHENYLEPHRINE HYDROCHLORIDE 100 MCG: 10 INJECTION INTRAVENOUS at 18:28

## 2025-05-20 RX ADMIN — NICOTINE POLACRILEX 2 MG: 2 GUM, CHEWING BUCCAL at 14:37

## 2025-05-20 NOTE — ANESTHESIA PROCEDURE NOTES
Spinal Block      Patient location during procedure: OR  Start Time: 5/20/2025 6:14 PM  Stop Time: 5/20/2025 6:20 PM  Indication:at surgeon's request and procedure for pain  Performed By  CRNA/CAA: Cris Arnold CRNA  Preanesthetic Checklist  Completed: patient identified, IV checked, site marked, risks and benefits discussed, surgical consent, monitors and equipment checked, pre-op evaluation and timeout performed  Spinal Block Prep:  Patient Position:sitting  Sterile Tech:cap, gloves, mask and sterile barriers  Prep:Chloraprep  Patient Monitoring:blood pressure monitoring, continuous pulse oximetry and EKG    Spinal Block Procedure  Approach:midline  Guidance:landmark technique and palpation technique  Needle Type:Jose  Needle Gauge:27 G  Placement of Spinal needle event:cerebrospinal fluid aspirated  Paresthesia: no  Fluid Appearance:clear  Medications: bupivacaine PF (MARCAINE) injection 0.75% - Epidural   1.6 mL - 5/20/2025 6:20:00 PM   Post Assessment  Patient Tolerance:patient tolerated the procedure well with no apparent complications  Complications no  Additional Notes  Risks discussed , including but not limited to:  Headache, itching, n&v, infection, failure, decreased blood pressure, permanent/chronic back pain, nerve damage, paralysis, etc. All questions answered and informed consent obtained. Skin localized with lidocaine skin wheel. 1.6_ ml 0.75% Marcaine with dextrose intrathecal.  Utilized CSE technique.  !8 g epidural.  GABRIELE at 8 cm, cse needle easliy through epidural needle with clear csf, -heme, -parasthesia.  T4 level obtained.

## 2025-05-20 NOTE — ANESTHESIA PREPROCEDURE EVALUATION
Anesthesia Evaluation     Patient summary reviewed and Nursing notes reviewed   no history of anesthetic complications:   NPO Solid Status: > 8 hours  NPO Liquid Status: > 8 hours           Airway   Mallampati: II  TM distance: >3 FB  Neck ROM: full  Difficult intubation highly probable  Dental    (+) poor dentition    Pulmonary - normal exam   (+) a smoker Current,  Cardiovascular - normal exam  Exercise tolerance: good (4-7 METS)    (+) hypertension (chronic HTN) well controlled less than 2 medications      Neuro/Psych  (+) psychiatric history (substance abuse)  GI/Hepatic/Renal/Endo    (+) obesity, morbid obesity    Musculoskeletal     Abdominal    Substance History   (+) drug use (methadone maintenance)     OB/GYN    (+) Pregnant, pregnancy induced hypertension        Other        ROS/Med Hx Other: 11.7/33.3  Plts: 202              Anesthesia Plan    ASA 3     spinal and ITN     (Risks and benefits discussed including risk of aspiration, recall and dental damage. All patient questions answered. Will continue with POC.)  intravenous induction     Anesthetic plan, risks, benefits, and alternatives have been provided, discussed and informed consent has been obtained with: patient.    Plan discussed with CRNA.    CODE STATUS:    Code Status (Patient has no pulse and is not breathing): CPR (Attempt to Resuscitate)  Medical Interventions (Patient has pulse or is breathing): Full Support  Level Of Support Discussed With: Patient

## 2025-05-20 NOTE — H&P
[facility]  Valentina Campos  : 1995  MRN: [id number]  CSN: [id number]    History and Physical    Chief Complaint   Patient presents with    Non-stress Test     Decreased fetal movement and serial bps      Subjective   Valentina Campos is a 29 y.o. year old  with an Estimated Date of Delivery: 25 currently at 35w4d sent from office  for monitoring due to BPP=1/8 . Baby is in breech position.    Prenatal care has been with [facility].  It has been complicated by narcotics maintenance program on Methadone and limited prenatal care. She is currently incarcerated. First PNV on 2024 @ 10 weeks with 5 visits. Weight loss = 16 lbs.    OB History    Para Term  AB Living   6 3 3 0 2 3   SAB IAB Ectopic Molar Multiple Live Births   2 0 0 0 0 3      # Outcome Date GA Lbr Harry/2nd Weight Sex Type Anes PTL Lv   6 Current            5 Term 21 40w0d  2835 g (6 lb 4 oz) F Vag-Spont   HERMILA   4 2017           3 Term 16   2920 g (6 lb 7 oz) F Vag-Spont   HERMILA   2 2013           1 Term 11   2977 g (6 lb 9 oz) M Vag-Spont   FD     Past Medical History:   Diagnosis Date    Multiple gestation     Substance abuse      Past Surgical History:   Procedure Laterality Date    CHOLECYSTECTOMY      D & C WITH SUCTION      DILATATION AND CURETTAGE      MOUTH SURGERY         No Known Allergies  Social History    Tobacco Use      Smoking status: Every Day        Packs/day: 1.00        Years: 1 pack/day for 11.4 years (11.4 ttl pk-yrs)        Types: Cigarettes        Start date: 2014      Smokeless tobacco: Never    Review of Systems   Constitutional: Negative.    Respiratory: Negative.     Cardiovascular: Negative.    Gastrointestinal: Negative.    Genitourinary: Negative.    Musculoskeletal: Negative.    Neurological: Negative.    Psychiatric/Behavioral: Negative.     All other systems reviewed and are negative.        Objective   /68   Pulse 79   Temp 97.8  °F (36.6 °C) (Temporal)   Resp 16   Wt (!) 140 kg (307 lb 9.6 oz)   SpO2 98%   BMI 52.80 kg/m²   General: well developed; well nourished  no acute distress  mentation appropriate   Neck:    Heart:   supple and no masses  normal apical impulse  regular rate and rhythm   Lungs: breathing is unlabored   Abdomen: Gravid and nontender  EFW: US today =5#9oz, 30%, breech   FHT's: nonreactive and category 2, had a reactive episode 4060-8086   Cervix: was not checked.   Presentation: breech   Contractions: none - external monitors used   Extremities:   normal appearance with no cyanosis or edema and no calf tenderness   Skin:  Skin color, texture, turgor normal. No rashes or lesions or Skin warm and dry   Psych:  Normal. and Alert and oriented, appropriate affect.     Prenatal Labs  Lab Results   Component Value Date    HGB 11.8 (L) 2025    HEPBSAG Negative 2024    ABSCRN Negative 2024    [id number] Non Reactive 2024       Current Labs Reviewed   Lab Results (last 24 hours)       Procedure Component Value Units Date/Time    POC Urinalysis Dipstick [[id number]] Collected: 25 115    Specimen: Urine, Clean Catch Updated: 25 115     Color Dark Yellow     Clarity, UA Clear     Glucose, UA Negative mg/dL      Bilirubin Negative     Ketones, UA Negative     Specific Gravity  1.010     Blood, UA Negative     pH, Urine 7.0     Protein, POC Negative mg/dL      Urobilinogen, UA Normal     Leukocytes Negative     Nitrite, UA Negative               ASSESSMENT  IUP at 35w4d  Group B strep status: unknown  Nonreactive NST  Malpresentation-breech  Methadone maintenance    PLAN  Admit to   Dr Schaefer discussed concerns and POC   section      Jenna Tom, APRN  2025  13:05 EDT

## 2025-05-20 NOTE — SIGNIFICANT NOTE
05/20/25 1431   Provider Notification   Provider Name Phone report to Dr Schaefer stating that pt is wanting to leave.  She doesn't want to have a Csection today because she doesn't have anyone with her and wants to have a support person here. She said if she can't leave and get a cigarrette, she will just leave. Orders received to give pt some Nicotine Gum.   Notification Route Phone call

## 2025-05-20 NOTE — PROGRESS NOTES
Prenatal Care Visit    Subjective   Chief Complaint   Patient presents with    Pregnancy Ultrasound     Growth. GBS done today.      History:   Valentina is a  currently at 35w4d who presents for a prenatal care visit today.    Denies CTX, VB, LOF. Reports (+) FM. She denies HA, vision changes, RUQ pain.        Objective   /84   Wt (!) 136 kg (300 lb 9.6 oz)   BMI 51.60 kg/m²   Physical Exam:  Normal, gestational age-appropriate exam today        Assessment & Plan     IUP @ 35w4d  Routine care: I have reviewed the prenatal labs and ultrasound(s) today. I have reviewed the most recent prenatal progress note(s). EFW scan/ BPP today: EFW 2532g (30%), AC 41%, breech, anterior placenta. BPP 2/8 (2 pts for [place], 0 pts for movement, tone or breathing).  CHTN: Pt reported a history of gHTN, noted to have mildly elevated BP at initial visits. Has not been on bASA. Baseline labs wnl, UPC not done. BP today reassuring and patient asymptomatic. Start twice weekly testing --> BPP today not reassuring.  H/o substance use, on methadone maintenance. Provided letter to officer present with patient that she should remain on methadone maintenance during pregnancy.  Incomplete anatomy/ limited PNC: suboptimal/ incomplete anatomy views on multiple scans. Patient was previously referred to [facility] for this concern but was unable to attend due to incarceration.  Non-reassuring fetal testing: BPP 2/8 today. Discussed recommendation to present to  for monitoring and likely delivery. Valentina voiced concerns regarding early delivery, particularly while she remains incarcerated, and we discussed the concern for significant fetal distress. She is agreeable to further monitoring at  and was taken to  after her visit.     Diagnosis Plan   1. Supervision of high risk pregnancy in third trimester        2. HTN in pregnancy, chronic        3. Methadone maintenance treatment affecting pregnancy, antepartum        4. Limited prenatal  care, antepartum        5. Abnormal fetal ultrasound           Medication Management: continue PNV, methadone    Topics discussed: Prenatal care milestones  Kick counts and fetal movement  Pre-eclampsia precautions   labor signs and symptoms   Tests next visit: BPP   Next visit: 1 week(s)     Jud Lucas MD  Obstetrics and Gynecology  [facility]

## 2025-05-20 NOTE — SIGNIFICANT NOTE
05/20/25 8189   Provider Notification   Provider Name Dr gomes in room to talk to patient about C/Section.   Notification Route Phone call   Response At bedside

## 2025-05-20 NOTE — OP NOTE
Renan Campos  : 1995  MRN: [id number]  CSN: [id number]    Operative Report    Pre-Operative Dx:   IUP at 35w4d weeks   Non-reassuring fetal status (BPP 2/10)  Breech presentation   Incomplete anatomy across several ultrasounds  Scant prenatal care  Incarceration during pregnancy  Chronic hypertension  BMI 53  Methadone maintenance in pregnancy      Postoperative dx:    Same     Procedure: Primary  (LTCS)       Surgeon:    Surgical assistant: HAN Mckeon was responsible for performing the following activities: Retraction, Suction, Placing Dressing, and Delivery of Fetus and their skilled assistance was necessary for the success of this case.       OR Staff:   Circulator: Tyra Amaya RN; Deirdre Herbert RN; Cherelle Soriano RN  Scrub Person: Ania Faulkner; Shahnaz Mathews  Baby Nurse: Ferdinand Rivera RN       Anesthesia: Spinal       EBL: 850 mls.       Antibiotics: cefazolin 3 gms     Drains:    Specimens: Morin    None     Findings:  Normal appearing uterus, fallopian tubes and ovaries.    Infant details        Infant observations: Weight: 2320 g (5 lb 1.8 oz)  Length: 17.5 in   Apgars: 8  @ 1 minute /    9  @ 5 minutes     Procedure Details:   The patient was taken to the operating room where regional anesthesia was found to be adequate. She was prepared and draped in the normal sterile fashion in the dorsal supine position with a leftward tilt. A Pfannenstiel skin incision was made with the scalpel and carried through to the underlying layer of fascia. The fascia was incised in the midline and the incision extended laterally with the Olvera scissors. The superior aspect of the fascial incision was grasped with the Kocher clamps, elevated and the underlying rectus muscles dissected off sharply. Attention was then turned to the inferior aspect of the fascial incision, which was grasped and tented up with the Kocher clamps. The underlying muscles were dissected  off in a similar fashion. The rectus muscles were then  in the midline, and the peritoneum identified, and entered bluntly. The peritoneal incision was extended superiorly and inferiorly with good visualization of the bladder. Small bilateral Maylard incisions were made across the rectus muscles in order to create more room for delivery. The Alejandro retractor was inserted atraumatically. The vesicouterine peritoneum was identified, grasped with the pickups, entered sharply, and the bladder flap was created digitally.     A low transverse uterine incision was made with the scalpel well above the bladder reflection and extended in a cephalad/caudad fashion. The infant was delivered using standard breech maneuvers. The infant was vigorous and cord clamping was delayed x 30 seconds with stimulation and suctioning of the nose and mouth. The cord was then clamped and cut and the infant was handed off to waiting staff.     The placenta was then removed with gentle traction on the cord and uterine massage.  The uterus was cleared of all clots and debris with a wet lap sponge.  The uterine incision was repaired with a 0 monocryl in a running locked fashion. A second imbricating layer was applied with the same suture. The Alejandro retractor was removed and the gutters were cleared of all clots. The uterine incision was reinspected and made sufficiently hemostatic with Bovie and Surgicel.    The fascia was elevated and the rectus muscles and subfascial tissues were made hemostatic with the bovie. The Maylard incisions were oversewn with 3-0 chromic suture. The peritoneum was not able to be closed. The fascia was closed with 0 PDS in a running fashion.  The subcutaneous tissues were then irrigated and the bovie was used to achieve satisfactory hemostasis. The subcutaneous space was closed with 3-0 chromic. The skin was closed with Insorb staples. Sponge, lap, needle, and instrument counts were correct per the OR staff.   The patient tolerated the procedure well and was taken to the recovery room in stable condition. She will be admitted to the postpartum unit for her post-operative care.      Complications:   None      Disposition:   Mother to Mother Baby/Postpartum in stable condition currently.   Baby to  nursery in stable condition currently.     Virgilio Schaefer MD  Obstetrics and Gynecology  [facility]  2025  19:51 EDT

## 2025-05-20 NOTE — OUTREACH NOTE
[facility]  Check-In    Current Estimated Gestational Age: 35w4d      Questions/Answers      Flowsheet Row Responses   Best Method for Contacting Cell   Demographics Reviewed Yes   Currently Employed No   Able to keep appointments as scheduled Yes   Gender(s) and Name(s) Boy-Shashi   Baby Active/Feeling Fetal Movemen Yes   Questions regarding prenatal visits or tests to be ordered? No   Education related to new diagnoses/home equipment No   Resource/Environmental Concerns Financial, Reliable Transportation   Do you have any questions related to your care experience, your pregnancy, plans for delivery, any concerns, etc? No   Other Education Meds to Beds, Substance Use Disorder Treatment, Mental Health Services, Insurance benefits/Incentives, Birth Plan, Transportation Assistance            Met with pt in office. Pt very anxious and being sent to  for monitoring. RN assisted pt to LH. PT denies any questions, needs or concerns at this time. RN to check in next visit or PP IP.    Alicia GUERRIER  Maternity Nurse Navigator    5/20/2025, 16:39 EDT

## 2025-05-21 PROBLEM — Z34.90 PREGNANCY: Status: ACTIVE | Noted: 2025-05-21

## 2025-05-21 LAB
BASOPHILS # BLD AUTO: 0.03 10*3/MM3 (ref 0–0.2)
BASOPHILS NFR BLD AUTO: 0.2 % (ref 0–1.5)
DEPRECATED RDW RBC AUTO: 42.2 FL (ref 37–54)
EOSINOPHIL # BLD AUTO: 0.01 10*3/MM3 (ref 0–0.4)
EOSINOPHIL NFR BLD AUTO: 0.1 % (ref 0.3–6.2)
ERYTHROCYTE [DISTWIDTH] IN BLOOD BY AUTOMATED COUNT: 12.2 % (ref 12.3–15.4)
HCT VFR BLD AUTO: 31 % (ref 34–46.6)
HGB BLD-MCNC: 10.7 G/DL (ref 12–15.9)
IMM GRANULOCYTES # BLD AUTO: 0.12 10*3/MM3 (ref 0–0.05)
IMM GRANULOCYTES NFR BLD AUTO: 0.7 % (ref 0–0.5)
LYMPHOCYTES # BLD AUTO: 1.27 10*3/MM3 (ref 0.7–3.1)
LYMPHOCYTES NFR BLD AUTO: 7.8 % (ref 19.6–45.3)
MCH RBC QN AUTO: 32.4 PG (ref 26.6–33)
MCHC RBC AUTO-ENTMCNC: 34.5 G/DL (ref 31.5–35.7)
MCV RBC AUTO: 93.9 FL (ref 79–97)
MONOCYTES # BLD AUTO: 1.1 10*3/MM3 (ref 0.1–0.9)
MONOCYTES NFR BLD AUTO: 6.8 % (ref 5–12)
NEUTROPHILS NFR BLD AUTO: 13.67 10*3/MM3 (ref 1.7–7)
NEUTROPHILS NFR BLD AUTO: 84.4 % (ref 42.7–76)
NRBC BLD AUTO-RTO: 0 /100 WBC (ref 0–0.2)
PLATELET # BLD AUTO: 209 10*3/MM3 (ref 140–450)
PMV BLD AUTO: 12.4 FL (ref 6–12)
RBC # BLD AUTO: 3.3 10*6/MM3 (ref 3.77–5.28)
WBC NRBC COR # BLD AUTO: 16.2 10*3/MM3 (ref 3.4–10.8)

## 2025-05-21 PROCEDURE — 85025 COMPLETE CBC W/AUTO DIFF WBC: CPT | Performed by: OBSTETRICS & GYNECOLOGY

## 2025-05-21 PROCEDURE — 25010000002 ENOXAPARIN PER 10 MG: Performed by: OBSTETRICS & GYNECOLOGY

## 2025-05-21 RX ORDER — METHADONE HYDROCHLORIDE 10 MG/ML
76 CONCENTRATE ORAL DAILY
Status: DISCONTINUED | OUTPATIENT
Start: 2025-05-21 | End: 2025-05-23 | Stop reason: HOSPADM

## 2025-05-21 RX ORDER — FERROUS SULFATE 324(65)MG
324 TABLET, DELAYED RELEASE (ENTERIC COATED) ORAL
Status: DISCONTINUED | OUTPATIENT
Start: 2025-05-21 | End: 2025-05-23 | Stop reason: HOSPADM

## 2025-05-21 RX ADMIN — ACETAMINOPHEN 1000 MG: 500 TABLET, FILM COATED ORAL at 10:01

## 2025-05-21 RX ADMIN — ACETAMINOPHEN 1000 MG: 500 TABLET, FILM COATED ORAL at 18:31

## 2025-05-21 RX ADMIN — IBUPROFEN 800 MG: 800 TABLET, FILM COATED ORAL at 06:28

## 2025-05-21 RX ADMIN — NICOTINE 1 PATCH: 21 PATCH TRANSDERMAL at 23:00

## 2025-05-21 RX ADMIN — IBUPROFEN 800 MG: 800 TABLET, FILM COATED ORAL at 23:01

## 2025-05-21 RX ADMIN — METHADONE HYDROCHLORIDE 76 MG: 10 CONCENTRATE ORAL at 10:01

## 2025-05-21 RX ADMIN — ACETAMINOPHEN 1000 MG: 500 TABLET, FILM COATED ORAL at 01:03

## 2025-05-21 RX ADMIN — PRENATAL VITAMINS-IRON FUMARATE 27 MG IRON-FOLIC ACID 0.8 MG TABLET 1 TABLET: at 10:01

## 2025-05-21 RX ADMIN — IBUPROFEN 800 MG: 800 TABLET, FILM COATED ORAL at 15:28

## 2025-05-21 RX ADMIN — ENOXAPARIN SODIUM 40 MG: 100 INJECTION SUBCUTANEOUS at 23:01

## 2025-05-21 NOTE — ANESTHESIA POSTPROCEDURE EVALUATION
Patient: Valentina Campos    Procedure Summary       Date: 25 Room / Location: [facility] OR 1 /  [facility] OR    Anesthesia Start:  Anesthesia Stop:     Procedure:  SECTION PRIMARY (Abdomen) Diagnosis:       Maternal care for other malpresentation of fetus, fetus 1      Abnormal fetal ultrasound      (Maternal care for other malpresentation of fetus, fetus 1 [O32.8XX1])      (Abnormal fetal ultrasound [O28.3])    Surgeons: Virgilio Schaefer MD Provider: Hanh Caballero CRNA    Anesthesia Type: spinal, ITN ASA Status: 3            Anesthesia Type: spinal, ITN    Vitals  Vitals Value Taken Time   BP 97/51 25 20:46   Temp 97.5 °F (36.4 °C) 25 20:03   Pulse 83 25 20:54   Resp 16 25 20:15   SpO2 100 % 25 20:54   Vitals shown include unfiled device data.        Post Anesthesia Care and Evaluation    Patient location during evaluation: bedside  Patient participation: complete - patient participated  Level of consciousness: awake and alert  Pain score: 1  Pain management: satisfactory to patient    Airway patency: patent  Anesthetic complications: No anesthetic complications  PONV Status: none  Cardiovascular status: acceptable and stable  Respiratory status: acceptable  Hydration status: acceptable    Comments: Vitals signs as noted in nursing documentation as per protocol.

## 2025-05-21 NOTE — PLAN OF CARE
Goal Outcome Evaluation:  Plan of Care Reviewed With: patient        Progress: improving  Outcome Evaluation: VSS, adequate pain relief, bleeding within normal limits,ambulating,bottlefeeding, + bonding noted, ordered,continue with current plan of care

## 2025-05-21 NOTE — PROGRESS NOTES
[facility]   PROGRESS NOTE    Post-Op Day 1 S/P   Subjective   Subjective  Patient reports:  Pain is well controlled. She is ambulating. Tolerating diet. Voiding - without difficulty; flatus reported..  Vaginal bleeding is light.    Objective    Objective     Vitals: Vital Signs Range for the last 24 hours  Temperature: Temp:  [97.5 °F (36.4 °C)-97.8 °F (36.6 °C)] 97.5 °F (36.4 °C)   Temp Source: Temp src: Oral   BP: BP: ()/() 113/60   Pulse: Heart Rate:  [] 111   Respirations: Resp:  [14-18] 18   SPO2: SpO2:  [97 %-100 %] 97 %   O2 Amount (l/min):     O2 Devices Device (Oxygen Therapy): room air   Weight: Weight:  [136 kg (300 lb 9.6 oz)-140 kg (307 lb 9.6 oz)] 140 kg (307 lb 9.6 oz)            Physical Exam    Lungs Respirations even and unlabored   Abdomen Soft, appropriately tender   Incision  healing well, scant sanguinous drainage   Extremities extremities normal, atraumatic, no cyanosis or edema     I reviewed the patient's new clinical results.CBC & Differential (2025 06:32)     Assessment & Plan        Maternal care for other malpresentation of fetus, fetus 1    Abnormal fetal ultrasound    Non-reassuring fetal status, delivered, current hospitalization    Incarceration    S/P  section    Pregnancy    Assessment & Plan    Assessment:    Valentina Campos is Day 1  post-partum  , Low Transverse  .    anemia    Plan:  continue post op care. Iron supplementation      Lakia Rosas PA-C  25  08:29 EDT

## 2025-05-21 NOTE — CASE MANAGEMENT/SOCIAL WORK
Case Management/Social Work    Patient Name:  Valentina Campos  YOB: 1995  MRN: [id number]  Admit Date:  5/20/2025    Pt has an active CPS case in [facility] ([phone]). Workers name is Ronak Doran.  Call placed to the office by this Sw. Spoke with Donna. States they are aware that pt was pregnant as they have been following her during her pregnancy. Pt was released from penitentiary yesterday. Had been in penitentiary on failure to report child abuse charges.  Donna requested a new CPS report be sent to central intake. States the report may not meet criteria, but she wanted something in the system to reflect pt had the baby. WEB ID number [id number]. Donna states she will reach out to pt's worker and having him call this Sw back once he returns to the office.  RN on [facility] notified.       Electronically signed by:  DOV Solorio  05/21/25 15:02 EDT

## 2025-05-21 NOTE — PLAN OF CARE
Goal Outcome Evaluation:  Plan of Care Reviewed With: patient        Progress: improving  Outcome Evaluation: VSS. I & O adequate, reports pain to be well managed. Ambulating in hallway. [facility] to be reaching out to Pt by tomarrow.

## 2025-05-21 NOTE — NURSING NOTE
Pt received from  via bed accompanied by RN x2 and family. Transferred from  bed without difficulty. POC explained with verbalized understanding.

## 2025-05-22 ENCOUNTER — PATIENT OUTREACH (OUTPATIENT)
Dept: LABOR AND DELIVERY | Facility: HOSPITAL | Age: 30
End: 2025-05-22
Payer: COMMERCIAL

## 2025-05-22 PROCEDURE — 25010000002 ENOXAPARIN PER 10 MG: Performed by: OBSTETRICS & GYNECOLOGY

## 2025-05-22 RX ORDER — HYDROXYZINE PAMOATE 25 MG/1
50 CAPSULE ORAL 3 TIMES DAILY PRN
Status: DISCONTINUED | OUTPATIENT
Start: 2025-05-22 | End: 2025-05-23 | Stop reason: HOSPADM

## 2025-05-22 RX ORDER — BUSPIRONE HYDROCHLORIDE 5 MG/1
5 TABLET ORAL EVERY 12 HOURS SCHEDULED
Status: DISCONTINUED | OUTPATIENT
Start: 2025-05-22 | End: 2025-05-23 | Stop reason: HOSPADM

## 2025-05-22 RX ADMIN — BUSPIRONE HYDROCHLORIDE 5 MG: 5 TABLET ORAL at 17:23

## 2025-05-22 RX ADMIN — IBUPROFEN 800 MG: 800 TABLET, FILM COATED ORAL at 06:20

## 2025-05-22 RX ADMIN — NICOTINE POLACRILEX 4 MG: 2 GUM, CHEWING BUCCAL at 17:23

## 2025-05-22 RX ADMIN — METHADONE HYDROCHLORIDE 76 MG: 10 CONCENTRATE ORAL at 09:04

## 2025-05-22 RX ADMIN — ACETAMINOPHEN 1000 MG: 500 TABLET, FILM COATED ORAL at 01:34

## 2025-05-22 RX ADMIN — ACETAMINOPHEN 1000 MG: 500 TABLET, FILM COATED ORAL at 17:22

## 2025-05-22 RX ADMIN — FERROUS SULFATE TAB EC 324 MG (65 MG FE EQUIVALENT) 324 MG: 324 (65 FE) TABLET DELAYED RESPONSE at 08:51

## 2025-05-22 RX ADMIN — ENOXAPARIN SODIUM 40 MG: 100 INJECTION SUBCUTANEOUS at 09:37

## 2025-05-22 RX ADMIN — PRENATAL VITAMINS-IRON FUMARATE 27 MG IRON-FOLIC ACID 0.8 MG TABLET 1 TABLET: at 08:51

## 2025-05-22 RX ADMIN — ENOXAPARIN SODIUM 40 MG: 100 INJECTION SUBCUTANEOUS at 22:24

## 2025-05-22 RX ADMIN — IBUPROFEN 800 MG: 800 TABLET, FILM COATED ORAL at 14:09

## 2025-05-22 RX ADMIN — IBUPROFEN 800 MG: 800 TABLET, FILM COATED ORAL at 22:25

## 2025-05-22 RX ADMIN — ACETAMINOPHEN 1000 MG: 500 TABLET, FILM COATED ORAL at 08:51

## 2025-05-22 RX ADMIN — NICOTINE POLACRILEX 4 MG: 2 GUM, CHEWING BUCCAL at 22:24

## 2025-05-22 NOTE — PLAN OF CARE
Goal Outcome Evaluation:   Pt. Vital signs stable, voiding without difficult, pain controlled on current pain medication. Pt. Allowed to go out x 1 only.

## 2025-05-22 NOTE — PLAN OF CARE
Goal Outcome Evaluation:  Plan of Care Reviewed With: patient        Progress: improving  Outcome Evaluation: VSS, adequate pain relief,bleeding WNL, incision clean dry and intact, bottlefeeding infant + bonding noted, seen by ,ambulating,instructed on signs and symptoms of ULISES,continue with current plan of care

## 2025-05-22 NOTE — PROGRESS NOTES
Renan Campos  : 1995  MRN: [id number]  CSN: [id number]    Post-operative Day #2  Subjective   Her pain is well controlled.  Vaginal bleeding is appropriate amount.  She is passing gas and has had a bowel movement.  Upon review of her respiratory system, she has no respiratory symptoms and and denies SOB, cough, wheezing, chest pain. She is bottle feeding. She really wants to smoke and state the nicotine patch isn't helping     Objective     Min/max vitals past 24 hours:   Temp  Min: 97.6 °F (36.4 °C)  Max: 98.7 °F (37.1 °C)  BP  Min: 113/71  Max: 135/85  Pulse  Min: 76  Max: 91  Resp  Min: 18  Max: 18        General: well developed; well nourished  no acute distress   Resp: breathing is unlabored   CV: Not performed.   Abdomen: soft, non-tender; no masses  incision is clean, dry, intact, and without drainage, mild edema in lower abdomen   Pelvic: Not performed   Ext: normal appearance with no cyanosis or edema and no calf tenderness     Results from last 7 days   Lab Units 25  0632 25  1302   WBC 10*3/mm3 16.20* 8.36   HEMOGLOBIN g/dL 10.7* 11.7*   HEMATOCRIT % 31.0* 33.3*   PLATELETS 10*3/mm3 209 202     Lab Results   Component Value Date    ABO A 2025    RH Positive 2025    RUBELLAABIGG 2.30 2024    HEPBSAG Negative 2024        Assessment   POD #2 S/P Primary  (LTCS)  Anemia     Plan   Continue routine post-operative care    Jenna Tom, APRMASOUD  2025  10:08 EDT

## 2025-05-22 NOTE — OUTREACH NOTE
[facility]  IP Postpartum    Questions/Answers      Flowsheet Row Responses   Best Method for Contacting Cell   Support Person Present No   Comment Incarcerated   Does the patient have a car seat at the hospital Yes   Delivery Note Reviewed Reviewed   Were birth expectations met? Yes   Is there a need for additional support/resources? No   Lactation Note Reviewed Reviewed   Is additional support needed? No   Any questions or concerns? No   Is the patient going to use Meds to Beds? Yes   Any concerns related discharge meds/ability to  prescriptions? No   OB Discharge Navigator Reviewed  Reviewed   Confirm Postpartum OB appointment No   Confirm initial well-child Pediatrician appointment date/time: No   Additional post-discharge F/U appointments No   Does patient have transportation to appointments? Yes   Any other assistance needed to ensure she is able to attend appointments? No          Valentina is feeling well after delivery. Provided with POST Birth warning signs flyer from [employer]. Encouraged to call with questions, concerns, or for support. Contact information provided. Will f/u 1 week post discharge.      Alicia GUERRIER  Maternity Nurse Navigator    5/22/2025, 17:49 EDT

## 2025-05-22 NOTE — NURSING NOTE
This pt. Noted to be upset at this am due to wanting to go smoke. Unit Director of women's care overhead conversation and with RN discussed her staying and that Manger would check with her superiors to see if she could go off to smoke.   12:30 pt requesting to leave now due to not having an answer whether she could go off floor. Spoke with Unit Director of women's care who said CNO was in meeting and she will go back down at 1 to speak with her.   1334- Unit Director spoke with pt. And told her that she could go off floor for just a few minutes and return. Unit Director recommends for pt. To be seen by [facility] department due to her anxiety. Pt. Ok with this at this time.   1440- Midwife spoke with pt. Pt. Does not want any anixety medications at this time and wants to go off floor then informed midwife she wanted to leave for 2 hours. Midwife informed pt. If she left for 2 hours she would have to be discharged and we would not be able to keep her room.   1510- Midwife informed Unit Director of Women's Care who went to speak with pt. Pt. Was told by Unit Director of Women's Care that she could leave but needed to be back shortly because if she was gone for 2 hours she would have to be discharged.  1515- Unit Director told pt. As she was getting ready to get on elevators to be back by 1530.

## 2025-05-23 VITALS
OXYGEN SATURATION: 99 % | TEMPERATURE: 97.7 F | WEIGHT: 293 LBS | SYSTOLIC BLOOD PRESSURE: 127 MMHG | RESPIRATION RATE: 18 BRPM | BODY MASS INDEX: 52.8 KG/M2 | DIASTOLIC BLOOD PRESSURE: 77 MMHG | HEART RATE: 93 BPM

## 2025-05-23 PROCEDURE — 0503F POSTPARTUM CARE VISIT: CPT | Performed by: MIDWIFE

## 2025-05-23 RX ORDER — ACETAMINOPHEN 500 MG
1000 TABLET ORAL EVERY 8 HOURS PRN
Qty: 60 TABLET | Refills: 0 | Status: SHIPPED | OUTPATIENT
Start: 2025-05-23

## 2025-05-23 RX ORDER — FERROUS SULFATE 324(65)MG
324 TABLET, DELAYED RELEASE (ENTERIC COATED) ORAL
Qty: 30 TABLET | Refills: 1 | Status: SHIPPED | OUTPATIENT
Start: 2025-05-23

## 2025-05-23 RX ORDER — DOCUSATE SODIUM 100 MG/1
100 CAPSULE, LIQUID FILLED ORAL 2 TIMES DAILY PRN
Qty: 30 CAPSULE | Refills: 0 | Status: SHIPPED | OUTPATIENT
Start: 2025-05-23

## 2025-05-23 RX ORDER — IBUPROFEN 800 MG/1
800 TABLET, FILM COATED ORAL EVERY 8 HOURS PRN
Qty: 60 TABLET | Refills: 0 | Status: SHIPPED | OUTPATIENT
Start: 2025-05-23

## 2025-05-23 RX ADMIN — PRENATAL VITAMINS-IRON FUMARATE 27 MG IRON-FOLIC ACID 0.8 MG TABLET 1 TABLET: at 09:11

## 2025-05-23 RX ADMIN — METHADONE HYDROCHLORIDE 76 MG: 10 CONCENTRATE ORAL at 09:05

## 2025-05-23 RX ADMIN — FERROUS SULFATE TAB EC 324 MG (65 MG FE EQUIVALENT) 324 MG: 324 (65 FE) TABLET DELAYED RESPONSE at 09:00

## 2025-05-23 RX ADMIN — ACETAMINOPHEN 1000 MG: 500 TABLET, FILM COATED ORAL at 09:11

## 2025-05-23 RX ADMIN — ACETAMINOPHEN 1000 MG: 500 TABLET, FILM COATED ORAL at 00:28

## 2025-05-23 RX ADMIN — IBUPROFEN 800 MG: 800 TABLET, FILM COATED ORAL at 06:29

## 2025-05-23 NOTE — PLAN OF CARE
Goal Outcome Evaluation:  Plan of Care Reviewed With: patient        Progress: improving  Outcome Evaluation: VSS, adequate pain relief,incision clean dry and intact awaiting word from  as to placement of infant, anticipating discharge

## 2025-05-23 NOTE — DISCHARGE SUMMARY
Discharge Summary     Renan Campos  : 1995  MRN: [id number]  CSN: [id number]    Date of Admission: 2025   Date of Discharge:  2025   Delivering Physician: Virgilio Schaefer MD       Admission Diagnosis: Pregnancy [Z34.90]   Discharge Diagnosis: Same as above plus  Pregnancy at 35w4d - delivered       Procedures: Primary  (LTCS)     Hospital Course  See the completed operative report for details regarding antepartum course and delivery.    Her post-operative course was unremarkable.  On POD # 3 she felt like she was ready for D/C.  Her baby will have to stay due to maternal Methadone use but she wants to be discharged and will come back later. She didn't like how Buspar made her feel last night and doesn't want a Rx. She was evaluated and it was determined she was able to be discharged to home.  She had no febrile morbidity. She had normal bowel and bladder function and was hemodynamically stable.  Her wound was healing well without obvious signs of infections. She is bottle feeding    Exam  General: no distress  Resp: unlabored  Abdomen: incision clean, dry intact  Extremities: no edema      Infant  male fetus weighing 2320 g (5 lb 1.8 oz)  Apgars -  8 @ 1 minute /  9 @ 5 minutes.    Discharge labs  Lab Results   Component Value Date    WBC 16.20 (H) 2025    HGB 10.7 (L) 2025    HCT 31.0 (L) 2025     2025       Discharge Medications     Discharge Medications        New Medications        Instructions Start Date   acetaminophen 500 MG tablet  Commonly known as: TYLENOL   1,000 mg, Oral, Every 8 Hours PRN      docusate sodium 100 MG capsule  Commonly known as: Colace   100 mg, Oral, 2 Times Daily PRN      ferrous sulfate 324 (65 Fe) MG tablet delayed-release EC tablet   324 mg, Oral, Daily With Breakfast      ibuprofen 800 MG tablet  Commonly known as: ADVIL,MOTRIN   800 mg, Oral, Every 8 Hours PRN             Continue These Medications         Instructions Start Date   famotidine 20 MG tablet  Commonly known as: Pepcid   20 mg, Oral, 2 Times Daily PRN      methadone 10 MG/ML solution  Commonly known as: DOLOPHINE   76 mg, Daily      prenatal vitamin 27-0.8 27-0.8 MG tablet tablet       promethazine 25 MG tablet  Commonly known as: PHENERGAN   25 mg, Oral, Every 6 Hours PRN             ASK your doctor about these medications        Instructions Start Date   methadone 10 MG tablet  Commonly known as: DOLOPHINE   Take by mouth,               Discharge Disposition Home or Self Care   Condition on Discharge: good   Follow-up: 1-2 weeks with [facility].     Time spent on discharge: 30 minutes or less  Jenna Tom, APRN  5/23/2025

## 2025-05-23 NOTE — PLAN OF CARE
Goal Outcome Evaluation:   Pt. Vital signs stable, voiding without difficult pain controlled on current pain medications. Fundus firm without massage, bleeding wnl,incision clean dry intact.  Discharge instructions, warnings signs, and follow up appointment gone over with pt. Patient has been informed she could stay and do care by parent but pt requesting to be discharge and leave. Informed we could not hold room for even though she plans on coming back in a couple of hours. Pt. Verbalized understanding. All questions and concerns addressed.

## 2025-05-23 NOTE — PLAN OF CARE
Goal Outcome Evaluation:   Pt. Vital signs stable, voiding without difficult pain controlled on current pain medications. Fundus firm without massage, bleeding wnl,incision clean dry intact.  Discharge instructions, warnings signs, and follow up appointment gone over with pt. Patient has been informed she could stay and do care by parent but pt requesting to be discharge and leave. Informed we could not hold room for even though she plans on coming back in a couple of hours. Pt. Verbalized understanding. All questions and concerns addressed. Pt handed back 10 empty bottles of her methadone medication

## 2025-05-24 NOTE — PAYOR COMM NOTE
"  D/c SUMMARY  UR MANAGER; JOHN BEAR, RN  [phone] AND [phone]    NPI:  [id number]  TAX ID:  [id number]      Valentina Campos (29 y.o. Female)       Date of Birth   1995    Social Security Number       Address   [address] [place] [address]    Home Phone   [phone]    MRN   [id number]       [employer]   [facility]    Marital Status                               Admission Date   5/20/2025    Admission Type   Elective    Admitting Provider   Jenna Tom CNM    Attending Provider       Department, Room/Bed   [facility], W206/1       Discharge Date   5/23/2025    Discharge Disposition   Home or Self Care    Discharge Destination                                 Attending Provider: (none)   Allergies: No Known Allergies    Isolation: None   Infection: None   Code Status: Prior    Ht: 162.6 cm (64\")   Wt: 140 kg (307 lb 9.6 oz)    Admission Cmt: None   Principal Problem: Pregnancy [Z34.90]                   Active Insurance as of 5/20/2025       Primary Coverage       Payor Plan Insurance Group Employer/Plan Group    [facility] [employer] [id number]       Payor Plan Address Payor Plan Phone Number Payor Plan Fax Number Effective Dates    [address] [address]   1/1/2021 - None Entered    [facility] [address]         Subscriber Name Subscriber Birth Date Member ID       VALENTINA CAMPOS 1995 [id number]                     Emergency Contacts        (Rel.) Home Phone Work Phone Mobile Phone    VASQUEZ CAMPOS (Spouse) [phone] -- [phone]              Physician Progress Notes (last 24 hours)  Notes from 05/23/25 0807 through 05/24/25 0807   No notes of this type exist for this encounter.          Discharge Summary        Jenna Tom CNM at 05/23/25 0854       Attestation signed by Virgilio Schaefer MD at 05/23/25 0927      I agree with the assessment and plan.    Virgilio Schaefer MD  Obstetrics " and Gynecology  [facility]                        Discharge Summary    [facility]  : 1995  MRN: [id number]  CSN: [id number]    Date of Admission: 2025   Date of Discharge:  2025   Delivering Physician: Virgilio Schaefer MD       Admission Diagnosis: Pregnancy [Z34.90]   Discharge Diagnosis: Same as above plus  Pregnancy at 35w4d - delivered       Procedures: Primary  (LTCS)     Hospital Course  See the completed operative report for details regarding antepartum course and delivery.    Her post-operative course was unremarkable.  On POD # 3 she felt like she was ready for D/C.  Her baby will have to stay due to maternal Methadone use but she wants to be discharged and will come back later. She didn't like how Buspar made her feel last night and doesn't want a Rx. She was evaluated and it was determined she was able to be discharged to home.  She had no febrile morbidity. She had normal bowel and bladder function and was hemodynamically stable.  Her wound was healing well without obvious signs of infections. She is bottle feeding    Exam  General: no distress  Resp: unlabored  Abdomen: incision clean, dry intact  Extremities: no edema      Infant  male fetus weighing 2320 g (5 lb 1.8 oz)  Apgars -  8 @ 1 minute /  9 @ 5 minutes.    Discharge labs  Lab Results   Component Value Date    WBC 16.20 (H) 2025    HGB 10.7 (L) 2025    HCT 31.0 (L) 2025     2025       Discharge Medications     Discharge Medications        New Medications        Instructions Start Date   acetaminophen 500 MG tablet  Commonly known as: TYLENOL   1,000 mg, Oral, Every 8 Hours PRN      docusate sodium 100 MG capsule  Commonly known as: Colace   100 mg, Oral, 2 Times Daily PRN      ferrous sulfate 324 (65 Fe) MG tablet delayed-release EC tablet   324 mg, Oral, Daily With Breakfast      ibuprofen 800 MG tablet  Commonly known as: ADVIL,MOTRIN   800 mg, Oral,  Every 8 Hours PRN             Continue These Medications        Instructions Start Date   famotidine 20 MG tablet  Commonly known as: Pepcid   20 mg, Oral, 2 Times Daily PRN      methadone 10 MG/ML solution  Commonly known as: DOLOPHINE   76 mg, Daily      prenatal vitamin 27-0.8 27-0.8 MG tablet tablet       promethazine 25 MG tablet  Commonly known as: PHENERGAN   25 mg, Oral, Every 6 Hours PRN             ASK your doctor about these medications        Instructions Start Date   methadone 10 MG tablet  Commonly known as: DOLOPHINE   Take by mouth,               Discharge Disposition Home or Self Care   Condition on Discharge: good   Follow-up: 1-2 weeks with [facility].     Time spent on discharge: 30 minutes or less  Jenna Tom, GABI  5/23/2025      Electronically signed by Virgilio Schaefer MD at 05/23/25 9926

## 2025-05-30 ENCOUNTER — PATIENT OUTREACH (OUTPATIENT)
Dept: LABOR AND DELIVERY | Facility: HOSPITAL | Age: 30
End: 2025-05-30
Payer: COMMERCIAL

## 2025-05-31 NOTE — OUTREACH NOTE
[facility]  Unable to Reach    Questions/Answers      Flowsheet Row Responses   Pending Outreach Postpartum Check-in   Call Attempt First   Outcome Not available   Next Call Attempt Date 05/30/25                Alicia GUERRIER  Maternity Nurse Navigator    5/30/2025, 20:01 EDT

## 2025-06-10 ENCOUNTER — PATIENT OUTREACH (OUTPATIENT)
Dept: LABOR AND DELIVERY | Facility: HOSPITAL | Age: 30
End: 2025-06-10
Payer: COMMERCIAL

## 2025-06-10 NOTE — OUTREACH NOTE
Motherhood Connection  Unable to Reach    Questions/Answers      Flowsheet Row Responses   Pending Outreach Postpartum Check-in   Call Attempt Second   Outcome No answer/busy   Next Call Attempt Date 06/10/25                Alicia GUERRIER  Maternity Nurse Navigator    6/10/2025, 14:10 EDT

## 2025-06-11 NOTE — OUTREACH NOTE
Motherhood Connection  Postpartum Check-In    Questions/Answers      Flowsheet Row Responses   Visit Setting Telephone   Best Method for Contacting Cell   OB Discharge Note Reviewed  Reviewed   OB Discharge Navigator Reviewed  Reviewed   OB Discharge Medications Reviewed  Reviewed    discharged home with mother? Infant in ICU   Comment Infant still in NICU. Pt states he will get to come home hopefully next week.   Current Pain Levels 0-10 2   At Rest Pain Levels 0-10 2   Pain level with activity 0-10 2   Acceptable Pain Level 0-10 2   Pain Location Abdomen   Pain Description Burning, Intermittent   Verbalized Emotional State Acceptance   Family/Support Network Family   Level of Involvement in Care Supportive, Attentive   Do you feel comfortable in your relationship with your baby? Yes   Have members of your household adjusted to your baby? Yes   Is the baby's father supportive and/or involved with the baby? N/A   How does your partner feel about the baby? Happy, Involved   Do you feel safe at home, school and work? Yes   Are you in a relationship with someone who threatens you or hurts you? No   Do you have the resources to keep yourself and your baby healthy and safe? Yes   Lochia (per patient report) [place]   Amount Scant   Number of pads per day 2   Lochia Odor None   Is patient breastfeeding? No   How is breast suppression going? fine, not leaking anymore   Postpartum Depression Screening Education Education Provided   Peripheral Blood Glucose Education Provided   Doctor Appointments: Education Provided   Breastfeeding Education Education Provided   Family Planning Education Education Provided   Postpartum Care Education Education Provided   S & S to report Education Provided   Followup Appointments Made No   Well Child Visit Appointments Made N/A   Did you complete the visit? No   Do you need to reschedule this appointment? No   Umbilical Cord No reported signs or symptoms   Was the baby circumcised? No    Feeding Readiness Cues: Crying   Is a lactation referral indicated? No   What safe sleep surface is available? Other   Other safe place to sleep: IN NICU            Review of Systems    Most Recent Delta  Depression Scale Score (EPDS)    Performed by a clinician: 0 (6/10/2025  7:54 PM)    Received via [employer] questionnaire:  ()         5 Ps Screen  Completed    PT states baby is still in NICU and may be discharged next week. Pt states she was readmitted because her  insicion was opening and she recieved antibiotics. RN gave SS to seek medical attention again. PT is not pumping and breast suppression has gone well. PT states she still needs a bassinet for baby at discharge. RN sending [facility] and [facility] resources for pt to call and inquire about getting a bassinet before baby comes home next week.     Alicia GUERRIER  Maternity Nurse Navigator    6/10/2025, 20:05 EDT

## 2025-06-17 ENCOUNTER — PATIENT OUTREACH (OUTPATIENT)
Dept: CALL CENTER | Facility: HOSPITAL | Age: 30
End: 2025-06-17
Payer: COMMERCIAL

## 2025-06-17 NOTE — OUTREACH NOTE
Motherhood Connection Survey      Flowsheet Row Responses   Nondenominational facility patient discharged from? [facility]   Week 1 attempt successful? No   Unsuccessful attempts Attempt 2   Reschedule Tomorrow              ROSE BERRY - Registered Nurse

## 2025-06-17 NOTE — OUTREACH NOTE
Motherhood Connection Survey      Flowsheet Row Responses   Cheondoism facility patient discharged from? [facility]   Week 1 attempt successful? No   Unsuccessful attempts Attempt 1   Reschedule Today              ROSE BERRY - Registered Nurse

## 2025-06-18 ENCOUNTER — PATIENT OUTREACH (OUTPATIENT)
Dept: CALL CENTER | Facility: HOSPITAL | Age: 30
End: 2025-06-18
Payer: COMMERCIAL

## 2025-06-18 NOTE — OUTREACH NOTE
Motherhood Connection Survey      Flowsheet Row Responses   Faith facility patient discharged from? [facility] 1 attempt successful? No   Unsuccessful attempts Attempt 3              Shanti BOYER - Registered Nurse

## (undated) DEVICE — SUT PDS 0 CT 36IN DYED Z358T

## (undated) DEVICE — STRIP,CLOSURE,WOUND,MEDI-STRIP,1/2X4: Brand: MEDLINE

## (undated) DEVICE — GOWN SURG ORBIS LVL3 LG STRL

## (undated) DEVICE — SOL IRR NACL 0.9PCT BO 1000ML

## (undated) DEVICE — TOWEL,OR,DSP,ST,NATURAL,DLX,4/PK,20PK/CS: Brand: MEDLINE

## (undated) DEVICE — LARGE, DISPOSABLE ALEXIS O C-SECTION PROTECTOR - RETRACTOR: Brand: ALEXIS ® O C-SECTION PROTECTOR - RETRACTOR

## (undated) DEVICE — PENCL SMOKE/EVAC MEGADYNE TELESCP 10FT

## (undated) DEVICE — ADHS LIQ MASTISOL 2/3ML

## (undated) DEVICE — TRAXI PANNICULUS RETRACTOR WITH RETENTUS TECHNOLOGY (BMI 30-50): Brand: TRAXI® PANNICULUS RETRACTOR

## (undated) DEVICE — GLV SURG SENSICARE PI LF PF 7.5 GRN STRL

## (undated) DEVICE — SLV SCD CALF HEMOFORCE DVT THERP REPROC MD

## (undated) DEVICE — STPLR SKIN SUBCUTICULAR INSORB 2030

## (undated) DEVICE — SUT MNCRYL 0 CT 36IN

## (undated) DEVICE — RICH C-SECTION: Brand: MEDLINE INDUSTRIES, INC.

## (undated) DEVICE — SUTURE GUT CHROMIC 3/0 912H

## (undated) DEVICE — PATIENT RETURN ELECTRODE, SINGLE-USE, CONTACT QUALITY MONITORING, ADULT, WITH 9FT CORD, FOR PATIENTS WEIGING OVER 33LBS. (15KG): Brand: MEGADYNE

## (undated) DEVICE — GLV SURG BIOGEL PI ULTRATOUCH G SZ7.5 LF

## (undated) DEVICE — DRSNG WND BORDR/ADHS NONADHR/GZ LF 4X10IN STRL